# Patient Record
Sex: FEMALE | Race: BLACK OR AFRICAN AMERICAN | NOT HISPANIC OR LATINO | Employment: UNEMPLOYED | URBAN - METROPOLITAN AREA
[De-identification: names, ages, dates, MRNs, and addresses within clinical notes are randomized per-mention and may not be internally consistent; named-entity substitution may affect disease eponyms.]

---

## 2017-04-27 ENCOUNTER — CLINICAL SUPPORT (OUTPATIENT)
Dept: SMOKING CESSATION | Facility: CLINIC | Age: 67
End: 2017-04-27
Payer: COMMERCIAL

## 2017-04-27 DIAGNOSIS — F17.210 HEAVY CIGARETTE SMOKER (20-39 PER DAY): Primary | ICD-10-CM

## 2017-04-27 PROCEDURE — 99999 PR PBB SHADOW E&M-EST. PATIENT-LVL II: CPT | Mod: PBBFAC,,,

## 2017-04-27 PROCEDURE — 99404 PREV MED CNSL INDIV APPRX 60: CPT | Mod: S$GLB,,,

## 2017-04-27 RX ORDER — IBUPROFEN 200 MG
1 TABLET ORAL DAILY
Qty: 14 PATCH | Refills: 0 | OUTPATIENT
Start: 2017-04-27 | End: 2017-05-11 | Stop reason: SDUPTHER

## 2017-04-27 RX ORDER — NICOTINE 7MG/24HR
1 PATCH, TRANSDERMAL 24 HOURS TRANSDERMAL DAILY
Qty: 14 PATCH | Refills: 0 | OUTPATIENT
Start: 2017-04-27 | End: 2017-05-11 | Stop reason: SDUPTHER

## 2017-05-11 ENCOUNTER — CLINICAL SUPPORT (OUTPATIENT)
Dept: SMOKING CESSATION | Facility: CLINIC | Age: 67
End: 2017-05-11
Payer: COMMERCIAL

## 2017-05-11 DIAGNOSIS — F17.210 CIGARETTE NICOTINE DEPENDENCE, UNCOMPLICATED: Primary | ICD-10-CM

## 2017-05-11 DIAGNOSIS — F17.210 HEAVY CIGARETTE SMOKER (20-39 PER DAY): ICD-10-CM

## 2017-05-11 PROCEDURE — 99404 PREV MED CNSL INDIV APPRX 60: CPT | Mod: S$GLB,,,

## 2017-05-11 PROCEDURE — 99999 PR PBB SHADOW E&M-EST. PATIENT-LVL I: CPT | Mod: PBBFAC,,,

## 2017-05-11 RX ORDER — IBUPROFEN 200 MG
1 TABLET ORAL DAILY
Qty: 14 PATCH | Refills: 0 | Status: SHIPPED | OUTPATIENT
Start: 2017-05-11 | End: 2017-05-25

## 2017-05-11 RX ORDER — NICOTINE 7MG/24HR
1 PATCH, TRANSDERMAL 24 HOURS TRANSDERMAL DAILY
Qty: 14 PATCH | Refills: 0 | Status: SHIPPED | OUTPATIENT
Start: 2017-05-11 | End: 2017-05-25

## 2017-05-11 NOTE — Clinical Note
Patient reports positive response to patches. States she has not smoked since 5/5. Celebrated patient quit. Discussed what are some things she does to get through the urges. States she tell herself no and do something else. Also says she has money to do other things now. Is excited about quitting. Reviewed  learned addiction model, personal reasons for quitting, medications, goals. Discussed working on starting new hobbies to replace cigarettes. Will discuss next visit. Refilled patches. Denies any abnormal behavior and mental changes at this time.

## 2017-05-11 NOTE — PROGRESS NOTES
Individual Follow-Up Form    5/11/2017    Quit Date: 5/5/17    Clinical Status of Patient: Outpatient    Length of Service: 60 minutes    Continuing Medication: yes  Patches    Other Medications: none     Target Symptoms: Withdrawal and medication side effects. The following were  rated moderate (3) to severe (4) on TCRS:  · Moderate (3): desire  · Severe (4): none    Comments: Patient reports positive response to patches. States she has not smoked since 5/5. Celebrated patient quit. Discussed what are some things she does to get through the urges. States she tell herself no and do something else. Also says she has money to do other things now. Is excited about quitting. Reviewed  learned addiction model, personal reasons for quitting, medications, goals. Discussed working on starting new hobbies to replace cigarettes. Will discuss next visit. Refilled patches. Denies any abnormal behavior and mental changes at this time.    Diagnosis: F17.210    Next Visit: 2 weeks

## 2017-05-25 ENCOUNTER — CLINICAL SUPPORT (OUTPATIENT)
Dept: SMOKING CESSATION | Facility: CLINIC | Age: 67
End: 2017-05-25
Payer: COMMERCIAL

## 2017-05-25 DIAGNOSIS — F17.210 CIGARETTE NICOTINE DEPENDENCE, UNCOMPLICATED: Primary | ICD-10-CM

## 2017-05-25 PROCEDURE — 99404 PREV MED CNSL INDIV APPRX 60: CPT | Mod: S$GLB,,,

## 2017-05-25 PROCEDURE — 99999 PR PBB SHADOW E&M-EST. PATIENT-LVL I: CPT | Mod: PBBFAC,,,

## 2017-05-25 RX ORDER — NICOTINE 7MG/24HR
1 PATCH, TRANSDERMAL 24 HOURS TRANSDERMAL DAILY
Qty: 14 PATCH | Refills: 0 | Status: SHIPPED | OUTPATIENT
Start: 2017-05-25

## 2017-05-25 NOTE — Clinical Note
Patient remains quit. No lapse or relapse reported. Has not used patches in a couple of days due to some itching but was not sure if it was due to something else. States she get through urges by remembering why she is quitting. Is looking for new hobbies to start.. Advised to used 7 mg patch and will wean her off patch. Denies any behavioral changes at this time.

## 2017-05-25 NOTE — PROGRESS NOTES
Individual Follow-Up Form    5/25/2017    Quit Date: 5/5/17    Clinical Status of Patient: Outpatient    Length of Service: 60 minutes    Continuing Medication: no    Other Medications: none     Target Symptoms: Withdrawal and medication side effects. The following were  rated moderate (3) to severe (4) on TCRS:  · Moderate (3): none  · Severe (4): none    Comments: Patient remains quit. No lapse or relapse reported. Has not used patches in a couple of days due to some itching but was not sure if it was due to something else. States she get through urges by remembering why she is quitting. Is looking for new hobbies to start.. Advised to used 7 mg patch and will wean her off patch. Denies any behavioral changes at this forrest    Diagnosis: F17.210    Next Visit: 2 weeks

## 2017-06-15 ENCOUNTER — TELEPHONE (OUTPATIENT)
Dept: SMOKING CESSATION | Facility: CLINIC | Age: 67
End: 2017-06-15

## 2017-06-15 NOTE — TELEPHONE ENCOUNTER
Unsuccessful contact at [phone] . Left message following up on quit status. Left office number to contact smoking cessation specialist..

## 2018-04-02 ENCOUNTER — TELEPHONE (OUTPATIENT)
Dept: SMOKING CESSATION | Facility: CLINIC | Age: 68
End: 2018-04-02

## 2018-04-04 ENCOUNTER — TELEPHONE (OUTPATIENT)
Dept: SMOKING CESSATION | Facility: CLINIC | Age: 68
End: 2018-04-04

## 2018-04-10 ENCOUNTER — TELEPHONE (OUTPATIENT)
Dept: SMOKING CESSATION | Facility: CLINIC | Age: 68
End: 2018-04-10

## 2018-04-26 ENCOUNTER — TELEPHONE (OUTPATIENT)
Dept: SMOKING CESSATION | Facility: CLINIC | Age: 68
End: 2018-04-26

## 2023-06-30 ENCOUNTER — TELEPHONE (OUTPATIENT)
Dept: SURGERY | Facility: CLINIC | Age: 73
End: 2023-06-30
Payer: COMMERCIAL

## 2023-06-30 NOTE — TELEPHONE ENCOUNTER
Attempted to contact pt regarding appt request. CRS number provided to return call.       ----- Message from Tracie Truong sent at 6/30/2023  9:26 AM CDT -----  Regarding: Appt  Contact: Pt [phone]  Pt is calling to schedule a appt please call

## 2023-07-06 ENCOUNTER — TELEPHONE (OUTPATIENT)
Dept: SURGERY | Facility: CLINIC | Age: 73
End: 2023-07-06
Payer: COMMERCIAL

## 2023-07-06 ENCOUNTER — TELEPHONE (OUTPATIENT)
Dept: SURGERY | Facility: CLINIC | Age: 73
End: 2023-07-06
Payer: MEDICARE

## 2023-07-06 NOTE — TELEPHONE ENCOUNTER
"Spoke with pt regarding appt request per Dr. Riojas with [facility]. Pt states, "the office was to send a referral for her regarding recent colonoscopy done that showed inflammation in colon. She was instructed to schedule appt with CRS." Informed pt that we will request records to office and to bring colored copies of colonoscopy done on 6/1/23 to appt. Pt verbalized understanding. Offered pt next available appt on 7/28 @ 0840 with Dr. De Paz at [facility]. Directions provided to clinic location. Sending appt reminder to pt. Pt denies further questions.       ----- Message from Roxanna Booker RN sent at 7/6/2023  2:13 PM CDT -----  Regarding: FW: Missed Call  Contact: pt.[phone]    ----- Message -----  From: Shruthi CANTU Route  Sent: 7/6/2023   2:06 PM CDT  To: Zandra Phan Staff  Subject: Missed Call                                      Pt missed a call from micky and is asking she call back. Patient Requesting Call Back @ pt.[phone]      "

## 2023-07-06 NOTE — TELEPHONE ENCOUNTER
Attempted to contact pt regarding request for appt. No answer. Left message with CRS number to return call.       ----- Message from Cathleen Sweeney sent at 7/6/2023  1:22 PM CDT -----  Regarding: return call/appt  Contact: @[phone]  Pt is returning a missed call from Bhavna ... in regards to making a appointment ...Please call and adv @[phone]

## 2023-07-26 ENCOUNTER — TELEPHONE (OUTPATIENT)
Dept: SURGERY | Facility: CLINIC | Age: 73
End: 2023-07-26
Payer: MEDICARE

## 2023-07-27 NOTE — PROGRESS NOTES
"CRS Office Visit History and Physical    Referring Md:   Ady Riojas Md  [address]  [address]  [place] [address]    SUBJECTIVE:     Chief Complaint: fecal incontinence, abnormal colonoscopy    History of Present Illness:  The patient is a new patient to this practice.   Course is as follows:  Luiza Joseph is a 72 y.o. female presents after recent colonoscopy with Dr. Riojas on 23 for evaluation of rectal bleeding.  She was found to have diverticulosis and erythema and congestion in the mid rectum.  Biopsies consistent with mucosal prolapse.  Patient reports history of fecal incontinence.  She reports that she will have bowel movements every 3-4 days.  These episodes are associated with incomplete evacuation and incontinence.  Wears depends due to these episodes.  She reports no feeling of need to defecate.  Also reports urinary urgency.  No history of diabetes or obstetric trauma.  She does take a daily fiber supplement.      Last Colonoscopy: [facility]     Review of patient's allergies indicates:  No Known Allergies    Past Medical History:   Diagnosis Date    Hyperlipidemia      No past surgical history on file.  No family history on file.  Social History     Tobacco Use    Smoking status: Former     Packs/day: 1.50     Years: 46.00     Pack years: 69.00     Types: Cigarettes     Quit date: 2017     Years since quittin.2    Smokeless tobacco: Never   Substance Use Topics    Alcohol use: No    Drug use: No        Review of Systems:  Review of Systems   All other systems reviewed and are negative.    OBJECTIVE:     Vital Signs (Most Recent)  /65 (BP Location: Right arm, Patient Position: Sitting, BP Method: Large (Automatic))   Pulse 80   Resp 18   Ht 5' 5" (1.651 m)   Wt 80.1 kg (176 lb 9.4 oz)   SpO2 96%   BMI 29.39 kg/m²     Physical Exam:  General: 72 y.o. female in no distress   Neuro: alert and oriented x 4.  Moves all extremities.     HEENT: normocephalic, " atraumatic, PERRL, EOMI   Respiratory: respirations are even and unlabored  Cardiac: regular rate and rhythm  Abdomen: soft, NTND  Extremities: Warm dry and intact  Skin: no rashes  Anorectal: no external masses, XENIA with decreased resting tone, increase in tone with squeeze, relaxation with bear down, no evidence of prolapse     Anoscopy:   Verbal consent obtained  A lubricated anoscope was inserted and circumferential inspection performed  There were nonbleeding internal hemorrhoids visualized.   The scope was withdrawn.   Patient tolerated the procedure well.     Labs: NA    Imaging: NA      ASSESSMENT/PLAN:     Diagnoses and all orders for this visit:    Full incontinence of feces  -     Ambulatory referral/consult to Physical/Occupational Therapy; Future  -     MRI Defecography; Future  -     CREATININE, SERUM; Future        72 y.o. female with suspected solitary rectal ulcer syndrome and fecal incontinence     - Patient's endoscopic records reviewed, unable to fully appreciate findings on black and white copy, but description and biopsies consistent with solitary rectal ulcer syndrome.   - Will change fiber supplement to fiber con 2 tabs TID, continue drinking 8-10 glasses of water daily  - manometry, MR defecography and pelvic floor PT  - will follow in 3-6 months at [place] for flexible sigmoidoscopy to re-assess     Emilie De Paz MD  Staff Surgeon  Colon & Rectal Surgery

## 2023-07-28 ENCOUNTER — TELEPHONE (OUTPATIENT)
Dept: ENDOSCOPY | Facility: HOSPITAL | Age: 73
End: 2023-07-28
Payer: MEDICARE

## 2023-07-28 ENCOUNTER — OFFICE VISIT (OUTPATIENT)
Dept: SURGERY | Facility: CLINIC | Age: 73
End: 2023-07-28
Payer: MEDICARE

## 2023-07-28 VITALS
HEIGHT: 65 IN | HEART RATE: 80 BPM | DIASTOLIC BLOOD PRESSURE: 65 MMHG | SYSTOLIC BLOOD PRESSURE: 127 MMHG | RESPIRATION RATE: 18 BRPM | BODY MASS INDEX: 29.42 KG/M2 | OXYGEN SATURATION: 96 % | WEIGHT: 176.56 LBS

## 2023-07-28 VITALS — BODY MASS INDEX: 27.62 KG/M2 | WEIGHT: 176 LBS | HEIGHT: 67 IN

## 2023-07-28 DIAGNOSIS — R15.9 FULL INCONTINENCE OF FECES: Primary | ICD-10-CM

## 2023-07-28 DIAGNOSIS — R15.9 INCONTINENCE OF FECES, UNSPECIFIED FECAL INCONTINENCE TYPE: Primary | ICD-10-CM

## 2023-07-28 PROCEDURE — 3288F PR FALLS RISK ASSESSMENT DOCUMENTED: ICD-10-PCS | Mod: CPTII,S$GLB,, | Performed by: SURGERY

## 2023-07-28 PROCEDURE — 1159F MED LIST DOCD IN RCRD: CPT | Mod: CPTII,S$GLB,, | Performed by: SURGERY

## 2023-07-28 PROCEDURE — 1126F AMNT PAIN NOTED NONE PRSNT: CPT | Mod: CPTII,S$GLB,, | Performed by: SURGERY

## 2023-07-28 PROCEDURE — 3288F FALL RISK ASSESSMENT DOCD: CPT | Mod: CPTII,S$GLB,, | Performed by: SURGERY

## 2023-07-28 PROCEDURE — 1101F PT FALLS ASSESS-DOCD LE1/YR: CPT | Mod: CPTII,S$GLB,, | Performed by: SURGERY

## 2023-07-28 PROCEDURE — 99999 PR PBB SHADOW E&M-EST. PATIENT-LVL III: ICD-10-PCS | Mod: PBBFAC,,, | Performed by: SURGERY

## 2023-07-28 PROCEDURE — 99204 PR OFFICE/OUTPT VISIT, NEW, LEVL IV, 45-59 MIN: ICD-10-PCS | Mod: 25,S$GLB,, | Performed by: SURGERY

## 2023-07-28 PROCEDURE — 3074F SYST BP LT 130 MM HG: CPT | Mod: CPTII,S$GLB,, | Performed by: SURGERY

## 2023-07-28 PROCEDURE — 1159F PR MEDICATION LIST DOCUMENTED IN MEDICAL RECORD: ICD-10-PCS | Mod: CPTII,S$GLB,, | Performed by: SURGERY

## 2023-07-28 PROCEDURE — 46600 PR DIAG2STIC A2SCOPY: ICD-10-PCS | Mod: S$GLB,,, | Performed by: SURGERY

## 2023-07-28 PROCEDURE — 3008F BODY MASS INDEX DOCD: CPT | Mod: CPTII,S$GLB,, | Performed by: SURGERY

## 2023-07-28 PROCEDURE — 3078F DIAST BP <80 MM HG: CPT | Mod: CPTII,S$GLB,, | Performed by: SURGERY

## 2023-07-28 PROCEDURE — 99999 PR PBB SHADOW E&M-EST. PATIENT-LVL III: CPT | Mod: PBBFAC,,, | Performed by: SURGERY

## 2023-07-28 PROCEDURE — 3008F PR BODY MASS INDEX (BMI) DOCUMENTED: ICD-10-PCS | Mod: CPTII,S$GLB,, | Performed by: SURGERY

## 2023-07-28 PROCEDURE — 1101F PR PT FALLS ASSESS DOC 0-1 FALLS W/OUT INJ PAST YR: ICD-10-PCS | Mod: CPTII,S$GLB,, | Performed by: SURGERY

## 2023-07-28 PROCEDURE — 46600 DIAGNOSTIC ANOSCOPY SPX: CPT | Mod: S$GLB,,, | Performed by: SURGERY

## 2023-07-28 PROCEDURE — 3074F PR MOST RECENT SYSTOLIC BLOOD PRESSURE < 130 MM HG: ICD-10-PCS | Mod: CPTII,S$GLB,, | Performed by: SURGERY

## 2023-07-28 PROCEDURE — 1126F PR PAIN SEVERITY QUANTIFIED, NO PAIN PRESENT: ICD-10-PCS | Mod: CPTII,S$GLB,, | Performed by: SURGERY

## 2023-07-28 PROCEDURE — 3078F PR MOST RECENT DIASTOLIC BLOOD PRESSURE < 80 MM HG: ICD-10-PCS | Mod: CPTII,S$GLB,, | Performed by: SURGERY

## 2023-07-28 PROCEDURE — 99204 OFFICE O/P NEW MOD 45 MIN: CPT | Mod: 25,S$GLB,, | Performed by: SURGERY

## 2023-07-28 RX ORDER — ROSUVASTATIN CALCIUM 20 MG/1
20 TABLET, COATED ORAL
COMMUNITY
Start: 2023-03-27

## 2023-07-28 RX ORDER — EZETIMIBE 10 MG/1
10 TABLET ORAL
COMMUNITY
Start: 2023-07-17

## 2023-07-28 NOTE — TELEPHONE ENCOUNTER
Contacted the patient to schedule an endoscopy procedure(s) 7/28/23. The patient did not answer the call and left a voice message requesting a call back.

## 2023-07-28 NOTE — TELEPHONE ENCOUNTER
Spoke to Luiza to schedule procedure(s) Anorectal Manometry       Physician to perform procedure(s) Dr. LEANNE Colon  Date of Procedure (s) 8/30/23  Arrival Time 12:00 PM  Time of Procedure(s) 1:00 PM   Location of Procedure(s) [place] 4th Floor  Type of Rx Prep sent to patient: Enema  Instructions provided to patient via Postal Mail    Patient was informed on the following information and verbalized understanding. Screening questionnaire reviewed with patient and complete. If procedure requires anesthesia, a responsible adult needs to be present to accompany the patient home, patient cannot drive after receiving anesthesia. Appointment details are tentative, especially check-in time. Patient will receive a prep-op call 4 days prior to confirm check-in time for procedure. If applicable the patient should contact their pharmacy to verify Rx for procedure prep is ready for pick-up. Patient was advised to call the scheduling department at [phone] if pharmacy states no Rx is available. Patient was advised to call the endoscopy scheduling department if any questions or concerns arise.       Endoscopy Scheduling Department

## 2023-07-28 NOTE — TELEPHONE ENCOUNTER
Anorectal Physiology Testing-Patient Instructions      Date of procedure: 8/30/23 Arrive at: 12:00PM      Location of Department:   [facility] [place] [address]  Take the [facility] Elevators to 4th Floor Endoscopy Lab    IMPORTANT:  PLEASE READ CAREFULLY.  FAILURE TO FOLLOW THESE INSTRUCTIONS MAY RESULT IN YOUR PROCEDURE BEING CANCELED, RESCHEDULED, OR REPEATED.        On occasion, unforeseen circumstances may cause a delay in your procedure start time. We respect your time and appreciate your patience during these circumstances.      Please leave all valuables, jewelry at home and wear comfortable clothing.     Purchase 2 (two) Fleet disposable enemas from the pharmacy.        If you have any questions about the cost of the procedure, you should contact your health insurance co. as soon as possible. You can also call Pre-service at [facility] for co-pay and deductibles at [phone].      Please bring a picture ID, insurance card, & copayment.                          The day of the procedure DATE: 8/30/23     DO NOT EAT OR DRINK TWO (2) HOURS PRIOR TO PROCEDURE (this will begin at    11:00 am on the morning of your procedure. You will return to your normal diet once test is complete).     11:00 am INSERT TWO (2) FLEET ENEMAS INTO RECTUM TWO (2) HOURS PRIOR TO PROCEDURE.     Please take medications at your regular scheduled times as directed by your doctor.       If you have questions regarding the prep or need to reschedule, please call Nurse [phone] between the hours of Monday-Friday 8:00-4:30pm.           Notes:

## 2023-07-28 NOTE — TELEPHONE ENCOUNTER
"----- Message from Debra Rueda sent at 2023  9:06 AM CDT -----  Regarding: FW: Anal Manometry    ----- Message -----  From: Bhavna Jose RN  Sent: 2023   8:46 AM CDT  To: [facility] Endo Schedulers, #  Subject: Anal Manometry                                   Procedure: Anal Manometry    Diagnosis: Fecal Incontinence     Procedure Timin-4 weeks    #If within 4 weeks selected, please gómez as high priority#    #If greater than 12 weeks, please select "5-12 weeks" and delay sending until 2 months prior to requested date#     Provider: Any endoscopist    Location: [address]    Additional Scheduling Information: No scheduling concerns    Prep Specifications:Enema    Have you attached a patient to this message: yes       "

## 2023-07-28 NOTE — TELEPHONE ENCOUNTER
----- Message from Debra Rueda sent at 7/28/2023 11:30 AM CDT -----  Regarding: FW: Missed Call  Contact: [phone]    ----- Message -----  From: Rachell Archuleta  Sent: 7/28/2023  10:36 AM CDT  To: [facility] Endo Schedulers  Subject: Missed Call                                      Calling in regards to missed call from office. Please call and discuss.

## 2023-07-28 NOTE — TELEPHONE ENCOUNTER
"Message -----   From: Bhavna Jose RN   Sent: 2023   8:46 AM CDT   To: [facility] Endo Schedulers, *   Subject: Anal Manometry                                   Procedure: Anal Manometry     Diagnosis: Fecal Incontinence     Procedure Timin-4 weeks     *If within 4 weeks selected, please gómez as high priority*     *If greater than 12 weeks, please select "5-12 weeks" and delay sending until 2 months prior to requested date*     Provider: Any endoscopist     Location: [address]     Additional Scheduling Information: No scheduling concerns     Prep Specifications:Enema     Have you attached a patient to this message: yes      "

## 2023-08-18 ENCOUNTER — TELEPHONE (OUTPATIENT)
Dept: SURGERY | Facility: CLINIC | Age: 73
End: 2023-08-18
Payer: MEDICARE

## 2023-08-18 NOTE — TELEPHONE ENCOUNTER
Attempted to contact pt regarding MRI and labs. No answer. Left message stating appts need to be r/s to [facility] location. CRS number provided to return call. Appts moved to next available, 9/13.

## 2023-08-18 NOTE — TELEPHONE ENCOUNTER
Attempted to contact pt regarding upcoming appt for labs, MRI, and follow up appt with Dr. De Paz. No answer. Left voicemail for pt to return call. Sending appt slips in mail.

## 2023-08-21 ENCOUNTER — TELEPHONE (OUTPATIENT)
Dept: SURGERY | Facility: CLINIC | Age: 73
End: 2023-08-21
Payer: MEDICARE

## 2023-08-21 NOTE — TELEPHONE ENCOUNTER
Spoke with pt regarding upcoming imaging and appts. Directions and instructions provided regarding location and prep for upcoming procedures. Informed of appt slips being sent in mail to home. Pt denies further questions and concerns at this time.       ----- Message from Chet Samson MA sent at 8/21/2023 10:00 AM CDT -----  Regarding: RESULTS  Good Morning, Pt is calling to speak with someone in provider office regarding she wants to know about labs and MRI questions she is asking for a return call please call pt at [phone]

## 2023-08-28 ENCOUNTER — TELEPHONE (OUTPATIENT)
Dept: ENDOSCOPY | Facility: HOSPITAL | Age: 73
End: 2023-08-28
Payer: MEDICARE

## 2023-08-30 ENCOUNTER — HOSPITAL ENCOUNTER (OUTPATIENT)
Facility: HOSPITAL | Age: 73
Discharge: HOME | End: 2023-08-30
Attending: STUDENT IN AN ORGANIZED HEALTH CARE EDUCATION/TRAINING PROGRAM | Admitting: SURGERY
Payer: MEDICARE

## 2023-08-30 VITALS
DIASTOLIC BLOOD PRESSURE: 77 MMHG | WEIGHT: 173 LBS | HEIGHT: 67 IN | SYSTOLIC BLOOD PRESSURE: 123 MMHG | OXYGEN SATURATION: 97 % | TEMPERATURE: 98 F | RESPIRATION RATE: 16 BRPM | HEART RATE: 75 BPM | BODY MASS INDEX: 27.15 KG/M2

## 2023-08-30 DIAGNOSIS — R15.9 FECAL INCONTINENCE: ICD-10-CM

## 2023-08-30 PROCEDURE — 91122 PR ANAL PRESSURE RECORD: ICD-10-PCS | Mod: 26,,, | Performed by: STUDENT IN AN ORGANIZED HEALTH CARE EDUCATION/TRAINING PROGRAM

## 2023-08-30 PROCEDURE — 91120 HC RECTAL SENSATION TEST, BALLOON: CPT | Mod: TC | Performed by: STUDENT IN AN ORGANIZED HEALTH CARE EDUCATION/TRAINING PROGRAM

## 2023-08-30 PROCEDURE — 91120 PR RECTAL SENSATION TEST, BALLOON: CPT | Mod: 26,,, | Performed by: STUDENT IN AN ORGANIZED HEALTH CARE EDUCATION/TRAINING PROGRAM

## 2023-08-30 PROCEDURE — 91122 HC ANORECTAL MANOMETRY: CPT | Mod: TC | Performed by: STUDENT IN AN ORGANIZED HEALTH CARE EDUCATION/TRAINING PROGRAM

## 2023-08-30 PROCEDURE — 91122 PR ANAL PRESSURE RECORD: CPT | Mod: 26,,, | Performed by: STUDENT IN AN ORGANIZED HEALTH CARE EDUCATION/TRAINING PROGRAM

## 2023-08-30 PROCEDURE — 91120 PR RECTAL SENSATION TEST, BALLOON: ICD-10-PCS | Mod: 26,,, | Performed by: STUDENT IN AN ORGANIZED HEALTH CARE EDUCATION/TRAINING PROGRAM

## 2023-08-30 NOTE — OR NURSING
Anorectal manometry completed. Tolerated well. Scant amount of blood on catheter balloon on removal and scant amount with mucous after passing 60cc water filled expulsion balloon. Passed balloon in 20 seconds.AVS and discharge instructions reviewed and understood. No pain at time of discharge.

## 2023-09-05 ENCOUNTER — CLINICAL SUPPORT (OUTPATIENT)
Dept: REHABILITATION | Facility: OTHER | Age: 73
End: 2023-09-05
Attending: SURGERY
Payer: MEDICARE

## 2023-09-05 DIAGNOSIS — R15.9 FULL INCONTINENCE OF FECES: ICD-10-CM

## 2023-09-05 DIAGNOSIS — M62.81 WEAKNESS OF TRUNK MUSCULATURE: ICD-10-CM

## 2023-09-05 DIAGNOSIS — R27.8 COORDINATION IMPAIRMENT: ICD-10-CM

## 2023-09-05 DIAGNOSIS — M62.89 PELVIC FLOOR DYSFUNCTION: ICD-10-CM

## 2023-09-05 PROCEDURE — 97530 THERAPEUTIC ACTIVITIES: CPT | Mod: PN | Performed by: PHYSICAL THERAPIST

## 2023-09-05 PROCEDURE — 97161 PT EVAL LOW COMPLEX 20 MIN: CPT | Mod: PN | Performed by: PHYSICAL THERAPIST

## 2023-09-05 NOTE — PATIENT INSTRUCTIONS
Bowel Movement Body Mechanics  1. Sit on the toilet comfortably with legs and buttocks relaxed.  2. Put your feet on a step stool or squatty potty (~8 inches tall).  3. Lean forward while keeping your back straight and rest your elbows on your knees.  4. Keep your knees apart and place your hands on your belly  5. Inhale and make your belly big  6. Make the belly hard as you exhale like you are blowing out birthday candles while you gently bear down.   *Press the hands over the lower belly to prevent it from pushing out with bearing down  *Do not strain, do not hold your breath.
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
## 2023-09-06 NOTE — PROVATION PATIENT INSTRUCTIONS
Discharge Summary/Instructions after an Endoscopic Procedure  Patient Name: Luiza Joseph  Patient MRN: [id number]  Patient YOB: 1950 Wednesday, August 30, 2023  Fahad Poole MD  Dear patient,  As a result of recent federal legislation (The Federal Cures Act), you may   receive lab or pathology results from your procedure in your [facility]   account before your physician is able to contact you. Your physician or   their representative will relay the results to you with their   recommendations at their soonest availability.  Thank you,  RESTRICTIONS:  During your procedure today, you received medications for sedation.  These   medications may affect your judgment, balance and coordination.  Therefore,   for 24 hours, you have the following restrictions:   - DO NOT drive a car, operate machinery, make legal/financial decisions,   sign important papers or drink alcohol.    ACTIVITY:  Today: no heavy lifting, straining or running due to procedural   sedation/anesthesia.  The following day: return to full activity including work.  DIET:  Eat and drink normally unless instructed otherwise.     TREATMENT FOR COMMON SIDE EFFECTS:  - Mild abdominal pain, nausea, belching, bloating or excessive gas:  rest,   eat lightly and use a heating pad.  - Sore Throat: treat with throat lozenges and/or gargle with warm salt   water.  - Because air was used during the procedure, expelling large amounts of air   from your rectum or belching is normal.  - If a bowel prep was taken, you may not have a bowel movement for 1-3 days.    This is normal.  SYMPTOMS TO WATCH FOR AND REPORT TO YOUR PHYSICIAN:  1. Abdominal pain or bloating, other than gas cramps.  2. Chest pain.  3. Back pain.  4. Signs of infection such as: chills or fever occurring within 24 hours   after the procedure.  5. Rectal bleeding, which would show as bright red, maroon, or black stools.   (A tablespoon of blood from the rectum is not serious,  especially if   hemorrhoids are present.)  6. Vomiting.  7. Weakness or dizziness.  GO DIRECTLY TO THE NEAREST EMERGENCY ROOM IF YOU HAVE ANY OF THE FOLLOWING:      Difficulty breathing              Chills and/or fever over 101 F   Persistent vomiting and/or vomiting blood   Severe abdominal pain   Severe chest pain   Black, tarry stools   Bleeding- more than one tablespoon   Any other symptom or condition that you feel may need urgent attention  Your doctor recommends these additional instructions:  If any biopsies were taken, your doctors clinic will contact you in 1 to 2   weeks with any results.  - Refer to pelvic floor physical therapy  For questions, problems or results please call your physician - Fahad Poole MD at Work:  [phone].  [facility] [place], EMERGENCY ROOM PHONE NUMBER: [phone]  IF A COMPLICATION OR EMERGENCY SITUATION ARISES AND YOU ARE UNABLE TO REACH   YOUR PHYSICIAN - GO DIRECTLY TO THE EMERGENCY ROOM.  Fahad Poole MD  9/6/2023 1:47:58 PM  This report has been verified and signed electronically.  Dear patient,  As a result of recent federal legislation (The Federal Cures Act), you may   receive lab or pathology results from your procedure in your [facility]   account before your physician is able to contact you. Your physician or   their representative will relay the results to you with their   recommendations at their soonest availability.  Thank you,  PROVATION

## 2023-09-07 ENCOUNTER — TELEPHONE (OUTPATIENT)
Dept: ENDOSCOPY | Facility: HOSPITAL | Age: 73
End: 2023-09-07
Payer: MEDICARE

## 2023-09-07 NOTE — TELEPHONE ENCOUNTER
Called patient. Patient did not answer. Left message unsure of who tried to reach her about a procedure. May be from a duplicate  inbasket order for an ARM procedure. Phone number provided.

## 2023-09-12 NOTE — PROGRESS NOTES
Pelvic Health Physical Therapy   Treatment Note     Name: Luiza [facility]  Clinic Number: [id number]    Therapy Diagnosis:   Encounter Diagnoses   Name Primary?    Pelvic floor dysfunction Yes    Weakness of trunk musculature     Coordination impairment      Referring Provider: Emilie De Paz MD    Visit Date: 9/14/2023    Referring Provider Orders: PT Eval and Treat  Medical Diagnosis from Referral: Full incontinence of feces [R15.9]  Evaluation Date: 9/5/2023  Authorization Period Expiration: 7/27/2024  Plan of Care Expiration: 12/5/2023  Visit # / Visits authorized: 2/20  FOTO: 1 (9/5/2023)    Cancelled Visits: -  No Show Visits: -    Time In: 12:56  Time Out: 13:52  Total Billable Time: 53 minutes    Precautions: standard    Subjective     Luiza reports her usual symptoms. Defecating with basin under feet helps slightly.    She  was somewhat  compliant with home exercise program.  Response to previous treatment: no adverse effect  Functional change: slightly improved ease of having bowel movements    Pain: none related to pelvic floor    Objective     Luiza received the following interventions during the treatment session:   (TrA = transverse abdominis, PFM = pelvic floor muscle, sEMG = surface electromyography)  Pt consented to pelvic floor muscle assessment and treatment in prior visit. See EMR.    Therapeutic activities to improve functional performance for 15 minutes -  [x] Pelvic floor and belly assessment - see below  [x] Education on bowel movement optimization - positioning, toilet stool, breath coordination, pelvic floor relaxation, abdominal wall bracing  [x] HEP building/HEP review    Neuromuscular re-education activities to develop Coordination and Control for 30 minutes -   [x] Diaphragmatic breathing with verbal and tactile cues - pt able to demonstrate correctly with verbal and tactile cues  [x] Practice bearing down with verbal and tactile cues to belly and/or pelvic floor - Pt improved  "with practice and with visual feedback from mirror, though she struggles with pairing abdominal wall contraction with bearing down, stating "I've been doing it wrong this whole time."  [x] PFM squeezes without glute substitution, 2x5 with 10-second hold - verbal cues required  [x] PFM squeeze + bridging - unable to tolerate due to hamstring spasm    Therapeutic exercises to develop strength, endurance and core stabilization for 8 minutes -  [x] Supine clam with blue band around knees, x30  [x] Bent knee fall outs (R&L), x20    ----------------------------------------    ABDOMINAL WALL ASSESSMENT  Palpation: no tenderness, low tone  Pelvic Girdle Stability: Pt demonstrates moderately impaired ability to stabilize pelvis with Active Straight Leg Raise Test.   Diastasis Recti: present, 1-finger width, present with supine curl-up task    BREATHING MECHANICS ASSESSMENT   Thorax Assessment During Quiet Respiration: Decreased excursion of abdominal wall   Thorax Assessment During Deep Respiration: Decreased excursion of abdominal wall     RECTAL PELVIC FLOOR EXAM - external assessment  Anus: WNL  Skin condition: WNL   Scarring: none  Sensation: WNL   Pain: no tenderness  Voluntary contraction: visible lift and accessory muscle use  Voluntary relaxation: visible drop  Involuntary contraction: visible lift  Bearing down: bulge  Discharge: none   Comments: good contraction, fatigues after ~5 reps, no discontinuity of external anal sphincter     Home Exercises and Patient Education     Patient Education: progression of plan of care, plan for next session, pt prognosis, pelvic floor anatomy & function, relationship between TrA & PFM, relationship between hips & PFM, proper body mechanics for bowel movement, and abdominal wall anatomy    Home Exercise Program: use toilet stool  Home Exercise Program Updates: PFM squeezes, bent knee fall outs, supine clam    Exercises were reviewed, and Luiza was able to demonstrate them " prior to the end of the session, as needed. Luiza demonstrated good understanding of the education provided.     Assessment     Pt tolerated treatment session well, with fair understanding of education provided and improving pelvic floor and belly coordination by the end of the session. Pt demonstrates functional strength deficits, finding all strengthening exercises challenging today. Improving strength in the abdominal wall and pelvic floor should improve continence and ability to produce effective bowel movements. Pt's functional mobility and ability to perform ADLs still limited by pelvic floor dysfunction. She requires skilled therapy for continued patient education and coordination retraining.      Luiza is progressing well towards her goals.   Pt prognosis: good    Pt will continue to benefit from skilled outpatient physical therapy to address the deficits listed in the problem list box on initial evaluation, provide pt/family education and to maximize pt's level of independence in the home and community environment.     Pt's spiritual, cultural and educational needs considered and pt agreeable to plan of care and goals.  Anticipated barriers to physical therapy: cognitive deficits      Short Term Goals: 6 weeks   Pt to report >50% improvement in fecal leakage - NOT MET  Pt to report nocturia of no more than 3 times per night for improved sleep quality - NOT MET  Pt to report >50% improvement in constipation and ease of bowel movements to reduce impact on urinary urgency - NOT MET  Pt to report >50% improvement in discomfort with defecation - NOT MET     Long Term Goals: 12 weeks   Pt to report >90% improvement in fecal leakage - NOT MET  Pt to report nocturia of no more than 1-2 times per night for improved sleep quality - NOT MET  Pt to report >90% improvement in constipation and ease of bowel movements to reduce impact on urinary urgency - NOT MET  Pt to report >90% improvement in discomfort with  defecation - NOT MET  Pt to score no more than 30% impairment on the Bowel Constipation FOTO survey to demonstrate reduced impairment due to pelvic floor dysfunction - NOT MET     Plan     Continue per Plan of Care      Jennie Campuzano, PT, DPT

## 2023-09-13 ENCOUNTER — HOSPITAL ENCOUNTER (OUTPATIENT)
Dept: RADIOLOGY | Facility: HOSPITAL | Age: 73
Discharge: HOME | End: 2023-09-13
Attending: SURGERY
Payer: MEDICARE

## 2023-09-13 DIAGNOSIS — R15.9 FULL INCONTINENCE OF FECES: ICD-10-CM

## 2023-09-13 PROCEDURE — 72195 MRI DEFECOGRAPHY: ICD-10-PCS | Mod: 26,,, | Performed by: RADIOLOGY

## 2023-09-13 PROCEDURE — 72195 MRI PELVIS W/O DYE: CPT | Mod: 26,,, | Performed by: RADIOLOGY

## 2023-09-13 PROCEDURE — 72195 MRI PELVIS W/O DYE: CPT | Mod: TC

## 2023-09-14 ENCOUNTER — CLINICAL SUPPORT (OUTPATIENT)
Dept: REHABILITATION | Facility: OTHER | Age: 73
End: 2023-09-14
Payer: MEDICARE

## 2023-09-14 DIAGNOSIS — M62.81 WEAKNESS OF TRUNK MUSCULATURE: ICD-10-CM

## 2023-09-14 DIAGNOSIS — M62.89 PELVIC FLOOR DYSFUNCTION: Primary | ICD-10-CM

## 2023-09-14 DIAGNOSIS — R27.8 COORDINATION IMPAIRMENT: ICD-10-CM

## 2023-09-14 PROCEDURE — 97110 THERAPEUTIC EXERCISES: CPT | Mod: PN | Performed by: PHYSICAL THERAPIST

## 2023-09-14 PROCEDURE — 97530 THERAPEUTIC ACTIVITIES: CPT | Mod: PN | Performed by: PHYSICAL THERAPIST

## 2023-09-14 PROCEDURE — 97112 NEUROMUSCULAR REEDUCATION: CPT | Mod: PN | Performed by: PHYSICAL THERAPIST

## 2023-09-14 NOTE — PATIENT INSTRUCTIONS
Pelvic floor squeeze, 2 sets of 10 with 10-second hold  - Gently squeeze the pelvic floor (imagine closing the labia like doors to the vagina)  *Don't hold your breath  *Can perform lying down or sitting up    Supine clam with band, x30  - Holding the hips level, push against the band to widen the knees  *Don't hold your breath        Supine bent knee fall out (BKFO), x30  - Holding the hips level, gently drop one hip to the side (but only as far as you can control)  *Don't hold your breath

## 2023-09-18 NOTE — PROGRESS NOTES
Pelvic Health Physical Therapy   Treatment Note     Name: Luiza [facility]  Clinic Number: [id number]    Therapy Diagnosis:   Encounter Diagnoses   Name Primary?    Pelvic floor dysfunction Yes    Weakness of trunk musculature     Coordination impairment      Referring Provider: Emilie De Paz MD    Visit Date: 9/21/2023    Referring Provider Orders: PT Eval and Treat  Medical Diagnosis from Referral: Full incontinence of feces [R15.9]  Evaluation Date: 9/5/2023  Authorization Period Expiration: 7/27/2024  Plan of Care Expiration: 12/5/2023  Visit # / Visits authorized: 3/20  FOTO: 2 (9/5/2023, 9/21/2023)    Cancelled Visits: -  No Show Visits: -    Time In: 13:00  Time Out: 13:45  Total Billable Time: 40 minutes    Precautions: standard    Subjective     Luiza reports reduced bowel accidents this last week. She's been producing type 4 and 5 stool. Still sometimes has frequent bowel movements.    She  was somewhat  compliant with home exercise program.  Response to previous treatment: no adverse effect  Functional change: slightly improved ease of having bowel movements    Pain: none related to pelvic floor    Objective     Luiza received the following interventions during the treatment session:   (TrA = transverse abdominis, PFM = pelvic floor muscle, sEMG = surface electromyography)  Pt consented to pelvic floor muscle assessment and treatment in prior visit. See EMR.    Therapeutic activities to improve functional performance for 12 minutes -  [] Pelvic floor and belly assessment - see below  [x] Education on bowel movement optimization - positioning, toilet stool, breath coordination, pelvic floor relaxation, abdominal wall bracing  [x] HEP building/HEP review    Neuromuscular re-education activities to develop Coordination and Control for 8 minutes -   [] Diaphragmatic breathing with verbal and tactile cues - pt able to demonstrate correctly with verbal and tactile cues  [] Practice bearing down with  "verbal and tactile cues to belly and/or pelvic floor - Pt improved with practice and with visual feedback from mirror, though she struggles with pairing abdominal wall contraction with bearing down, stating "I've been doing it wrong this whole time."  [] PFM squeezes without glute substitution, 2x5 with 10-second hold - verbal cues required  [x] PFM squeeze + bridging with belt around knees, 2x6 - still gets hamstring spasm after a few reps  [x] PFM squeeze + hooklying hip adduction with ball between knees, 2x20    Therapeutic exercises to develop strength, endurance and core stabilization for 20 minutes -  [x] Supine clam with blue band around knees, x30  [x] Bent knee fall outs (R&L), x25  [x] Side-lying clams (R&L), x20  [x] Straight leg raise (R&L), 3x10  [x] Hooklying shoulder extension with blue band, x16        Limitation/Restriction for FOTO Pelvic Floor Surveys     Therapist reviewed FOTO scores for Luiza Joseph on 9/21/2023  FOTO documents entered into [employer] - see Media section.     Limitation Score:   9/5/23  PFDI Bowel - 42% impairment  Bowel Constipation - 45% impairment    9/21/23  PFDI Bowel - 8% impairment  Bowel Constipation - 48% impairment        Home Exercises and Patient Education     Patient Education: progression of plan of care, plan for next session, pt prognosis, pelvic floor anatomy & function, relationship between TrA & PFM, relationship between hips & PFM, proper body mechanics for bowel movement, and abdominal wall anatomy    Home Exercise Program (9/5/23): use toilet stool  Home Exercise Program Updates (9/14/23): PFM squeezes, bent knee fall outs, supine clam  HEP update (9/21/23): none    Exercises were reviewed, and Luiza was able to demonstrate them prior to the end of the session, as needed. Luiza demonstrated good understanding of the education provided.     Assessment     Pt tolerated treatment session well, with fair understanding of education provided and improving " pelvic floor and belly coordination by the end of the session. Pt demonstrates functional strength deficits, finding all strengthening exercises challenging today, but she tolerated better than last session. Improving strength in the abdominal wall and pelvic floor should improve continence and ability to produce effective bowel movements. Pt reports improving ability to produced bowel movement with focusing on abdominal wall activation for pushing, but she requires additional training. Pt's functional mobility and ability to perform ADLs still limited by pelvic floor dysfunction. She requires skilled therapy for continued patient education and coordination retraining.      Luiza is progressing well towards her goals.   Pt prognosis: good    Pt will continue to benefit from skilled outpatient physical therapy to address the deficits listed in the problem list box on initial evaluation, provide pt/family education and to maximize pt's level of independence in the home and community environment.     Pt's spiritual, cultural and educational needs considered and pt agreeable to plan of care and goals.  Anticipated barriers to physical therapy: cognitive deficits      Short Term Goals: 6 weeks   Pt to report >50% improvement in fecal leakage - MET  Pt to report nocturia of no more than 3 times per night for improved sleep quality - NOT MET  Pt to report >50% improvement in constipation and ease of bowel movements to reduce impact on urinary urgency - MET  Pt to report >50% improvement in discomfort with defecation - MET     Long Term Goals: 12 weeks   Pt to report >90% improvement in fecal leakage - NOT MET  Pt to report nocturia of no more than 1-2 times per night for improved sleep quality - NOT MET  Pt to report >90% improvement in constipation and ease of bowel movements to reduce impact on urinary urgency - NOT MET  Pt to report >90% improvement in discomfort with defecation - NOT MET  Pt to score no more than  30% impairment on the Bowel Constipation FOTO survey to demonstrate reduced impairment due to pelvic floor dysfunction - NOT MET     Plan     Continue per Plan of Care      Jennie Campuzano, PT, DPT

## 2023-09-21 ENCOUNTER — CLINICAL SUPPORT (OUTPATIENT)
Dept: REHABILITATION | Facility: OTHER | Age: 73
End: 2023-09-21
Payer: MEDICARE

## 2023-09-21 DIAGNOSIS — R27.8 COORDINATION IMPAIRMENT: ICD-10-CM

## 2023-09-21 DIAGNOSIS — M62.89 PELVIC FLOOR DYSFUNCTION: Primary | ICD-10-CM

## 2023-09-21 DIAGNOSIS — M62.81 WEAKNESS OF TRUNK MUSCULATURE: ICD-10-CM

## 2023-09-21 PROCEDURE — 97112 NEUROMUSCULAR REEDUCATION: CPT | Mod: PN | Performed by: PHYSICAL THERAPIST

## 2023-09-21 PROCEDURE — 97110 THERAPEUTIC EXERCISES: CPT | Mod: PN | Performed by: PHYSICAL THERAPIST

## 2023-09-21 PROCEDURE — 97530 THERAPEUTIC ACTIVITIES: CPT | Mod: PN | Performed by: PHYSICAL THERAPIST

## 2023-10-06 ENCOUNTER — CLINICAL SUPPORT (OUTPATIENT)
Dept: REHABILITATION | Facility: OTHER | Age: 73
End: 2023-10-06
Payer: MEDICARE

## 2023-10-06 DIAGNOSIS — R27.8 COORDINATION IMPAIRMENT: ICD-10-CM

## 2023-10-06 DIAGNOSIS — M62.89 PELVIC FLOOR DYSFUNCTION: Primary | ICD-10-CM

## 2023-10-06 DIAGNOSIS — M62.81 WEAKNESS OF TRUNK MUSCULATURE: ICD-10-CM

## 2023-10-06 PROCEDURE — 97530 THERAPEUTIC ACTIVITIES: CPT | Mod: PN | Performed by: PHYSICAL THERAPIST

## 2023-10-06 NOTE — PROGRESS NOTES
Pelvic Health Physical Therapy   Treatment Note     [place]  Clinic Number: [id number]    Therapy Diagnosis:   Encounter Diagnoses   Name Primary?    Pelvic floor dysfunction Yes    Weakness of trunk musculature     Coordination impairment      Referring Provider: Emilie De Paz MD    Visit Date: 10/6/2023    Referring Provider Orders: PT Eval and Treat  Medical Diagnosis from Referral: Full incontinence of feces [R15.9]  Evaluation Date: 9/5/2023  Authorization Period Expiration: 7/27/2024  Plan of Care Expiration: 12/5/2023  Visit # / Visits authorized: 5/20  FOTO: 2 (9/5/2023, 9/21/2023)    Cancelled Visits: -  No Show Visits: -    Time In: 16:00  Time Out: 16:47  Total Billable Time: 40 minutes    Precautions: standard    Subjective     Luiza reports significant rectal bleeding recently. She recalls an incident of fecal incontinence (stool falling out after bowel movement).    She was not compliant with home exercise program.  Response to previous treatment: no adverse effect  Functional change: slightly improved ease of having bowel movements    Pain: none related to pelvic floor    Objective     Luiza received the following interventions during the treatment session:   (TrA = transverse abdominis, PFM = pelvic floor muscle, sEMG = surface electromyography)  Pt consented to pelvic floor muscle assessment and treatment in prior visit. See EMR.    Therapeutic activities to improve functional performance for 38 minutes -  [x] Pelvic floor and belly assessment - good PFM squeeze and good coordination with bearing down  [x] Patient education - Pt required reinstruction on most topics already covered in physical therapy, including pelvic floor anatomy/function, mechanics of defecation, importance of toilet stool  [x] Education on bowel movement optimization - positioning, toilet stool, breath coordination, pelvic floor relaxation, abdominal wall bracing  [x] HEP building/HEP  "review    Neuromuscular re-education activities to develop Coordination and Control for 2 minutes -   [] Diaphragmatic breathing with verbal and tactile cues - pt able to demonstrate correctly with verbal and tactile cues  [] Practice bearing down with verbal and tactile cues to belly and/or pelvic floor - Pt improved with practice and with visual feedback from mirror, though she struggles with pairing abdominal wall contraction with bearing down, stating "I've been doing it wrong this whole time."  [x] PFM squeezes without glute substitution, 3x5 with 5-second hold - verbal cues required  [] PFM squeeze + bridging with belt around knees, 2x6 - still gets hamstring spasm after a few reps  [] PFM squeeze + hooklying hip adduction with ball between knees, 2x20    Therapeutic exercises to develop strength, endurance and core stabilization for 0 minutes -  [] Supine clam with blue band around knees, x30  [] Bent knee fall outs (R&L), x25  [] Side-lying clams (R&L), x20  [] Straight leg raise (R&L), 3x10  [] Hooklying shoulder extension with blue band, x16        Home Exercises and Patient Education     Patient Education: progression of plan of care, plan for next session, pt prognosis, pelvic floor anatomy & function, relationship between TrA & PFM, relationship between hips & PFM, proper body mechanics for bowel movement, and abdominal wall anatomy    Home Exercise Program (9/5/23): use toilet stool  Home Exercise Program Updates (9/14/23): PFM squeezes, bent knee fall outs, supine clam  HEP update (9/21/23): none  HEP update (10/6/23): none    Exercises were reviewed, and Luiza was able to demonstrate them prior to the end of the session, as needed. Luiza demonstrated good understanding of the education provided.     Assessment     Pt tolerated treatment session well, with fair understanding of education provided and improving pelvic floor and belly coordination by the end of the session. Reassessment of " pelvic floor coordination reveals good bearing down and squeezes. Improving strength in the abdominal wall and pelvic floor should improve continence and ability to produce effective bowel movements (exercises present in HEP). Pt's functional mobility and ability to perform ADLs still limited by pelvic floor dysfunction. She requires skilled therapy for continued patient education and coordination retraining.      Luiza is progressing well towards her goals.   Pt prognosis: good    Pt will continue to benefit from skilled outpatient physical therapy to address the deficits listed in the problem list box on initial evaluation, provide pt/family education and to maximize pt's level of independence in the home and community environment.     Pt's spiritual, cultural and educational needs considered and pt agreeable to plan of care and goals.  Anticipated barriers to physical therapy: cognitive deficits, HEP non-compliance      Short Term Goals: 6 weeks   Pt to report >50% improvement in fecal leakage - MET  Pt to report nocturia of no more than 3 times per night for improved sleep quality - NOT MET  Pt to report >50% improvement in constipation and ease of bowel movements to reduce impact on urinary urgency - MET  Pt to report >50% improvement in discomfort with defecation - MET     Long Term Goals: 12 weeks   Pt to report >90% improvement in fecal leakage - NOT MET  Pt to report nocturia of no more than 1-2 times per night for improved sleep quality - NOT MET  Pt to report >90% improvement in constipation and ease of bowel movements to reduce impact on urinary urgency - NOT MET  Pt to report >90% improvement in discomfort with defecation - NOT MET  Pt to score no more than 30% impairment on the Bowel Constipation FOTO survey to demonstrate reduced impairment due to pelvic floor dysfunction - NOT MET     Plan     Continue per Plan of Care      Jennie Campuzano, PT, DPT

## 2023-10-23 ENCOUNTER — TELEPHONE (OUTPATIENT)
Dept: SURGERY | Facility: CLINIC | Age: 73
End: 2023-10-23
Payer: MEDICARE

## 2023-10-23 ENCOUNTER — OFFICE VISIT (OUTPATIENT)
Dept: SURGERY | Facility: CLINIC | Age: 73
End: 2023-10-23
Payer: MEDICARE

## 2023-10-23 VITALS
HEART RATE: 77 BPM | WEIGHT: 170.56 LBS | HEIGHT: 67 IN | BODY MASS INDEX: 26.77 KG/M2 | SYSTOLIC BLOOD PRESSURE: 149 MMHG | RESPIRATION RATE: 19 BRPM | OXYGEN SATURATION: 98 % | DIASTOLIC BLOOD PRESSURE: 79 MMHG

## 2023-10-23 DIAGNOSIS — M62.89 PELVIC FLOOR DYSFUNCTION: Primary | ICD-10-CM

## 2023-10-23 PROCEDURE — 3288F FALL RISK ASSESSMENT DOCD: CPT | Mod: CPTII,S$GLB,, | Performed by: SURGERY

## 2023-10-23 PROCEDURE — 99999 PR PBB SHADOW E&M-EST. PATIENT-LVL III: ICD-10-PCS | Mod: PBBFAC,,, | Performed by: SURGERY

## 2023-10-23 PROCEDURE — 99999 PR PBB SHADOW E&M-EST. PATIENT-LVL III: CPT | Mod: PBBFAC,,, | Performed by: SURGERY

## 2023-10-23 PROCEDURE — 3288F PR FALLS RISK ASSESSMENT DOCUMENTED: ICD-10-PCS | Mod: CPTII,S$GLB,, | Performed by: SURGERY

## 2023-10-23 PROCEDURE — 1159F MED LIST DOCD IN RCRD: CPT | Mod: CPTII,S$GLB,, | Performed by: SURGERY

## 2023-10-23 PROCEDURE — 45330 DIAGNOSTIC SIGMOIDOSCOPY: CPT | Mod: 52,,, | Performed by: SURGERY

## 2023-10-23 PROCEDURE — 3008F BODY MASS INDEX DOCD: CPT | Mod: CPTII,S$GLB,, | Performed by: SURGERY

## 2023-10-23 PROCEDURE — 3078F PR MOST RECENT DIASTOLIC BLOOD PRESSURE < 80 MM HG: ICD-10-PCS | Mod: CPTII,S$GLB,, | Performed by: SURGERY

## 2023-10-23 PROCEDURE — 3078F DIAST BP <80 MM HG: CPT | Mod: CPTII,S$GLB,, | Performed by: SURGERY

## 2023-10-23 PROCEDURE — 45330 PR SIGMOIDOSCOPY,DIAG2STIC: ICD-10-PCS | Mod: 52,,, | Performed by: SURGERY

## 2023-10-23 PROCEDURE — 3077F SYST BP >= 140 MM HG: CPT | Mod: CPTII,S$GLB,, | Performed by: SURGERY

## 2023-10-23 PROCEDURE — 3077F PR MOST RECENT SYSTOLIC BLOOD PRESSURE >= 140 MM HG: ICD-10-PCS | Mod: CPTII,S$GLB,, | Performed by: SURGERY

## 2023-10-23 PROCEDURE — 99213 OFFICE O/P EST LOW 20 MIN: CPT | Mod: 25,S$GLB,, | Performed by: SURGERY

## 2023-10-23 PROCEDURE — 1101F PR PT FALLS ASSESS DOC 0-1 FALLS W/OUT INJ PAST YR: ICD-10-PCS | Mod: CPTII,S$GLB,, | Performed by: SURGERY

## 2023-10-23 PROCEDURE — 1125F PR PAIN SEVERITY QUANTIFIED, PAIN PRESENT: ICD-10-PCS | Mod: CPTII,S$GLB,, | Performed by: SURGERY

## 2023-10-23 PROCEDURE — 1125F AMNT PAIN NOTED PAIN PRSNT: CPT | Mod: CPTII,S$GLB,, | Performed by: SURGERY

## 2023-10-23 PROCEDURE — 1159F PR MEDICATION LIST DOCUMENTED IN MEDICAL RECORD: ICD-10-PCS | Mod: CPTII,S$GLB,, | Performed by: SURGERY

## 2023-10-23 PROCEDURE — 3008F PR BODY MASS INDEX (BMI) DOCUMENTED: ICD-10-PCS | Mod: CPTII,S$GLB,, | Performed by: SURGERY

## 2023-10-23 PROCEDURE — 1101F PT FALLS ASSESS-DOCD LE1/YR: CPT | Mod: CPTII,S$GLB,, | Performed by: SURGERY

## 2023-10-23 PROCEDURE — 99213 PR OFFICE/OUTPT VISIT, EST, LEVL III, 20-29 MIN: ICD-10-PCS | Mod: 25,S$GLB,, | Performed by: SURGERY

## 2023-10-23 NOTE — PROVATION PATIENT INSTRUCTIONS
Discharge Summary/Instructions after an Endoscopic Procedure  Patient Name: Luiza Joseph  Patient MRN: [id number]  Patient YOB: 1950 Monday, October 23, 2023  Emilie De Paz MD  Dear patient,  As a result of recent federal legislation (The Federal Cures Act), you may   receive lab or pathology results from your procedure in your [facility]   account before your physician is able to contact you. Your physician or   their representative will relay the results to you with their   recommendations at their soonest availability.  Thank you,  RESTRICTIONS:  During your procedure today, you received medications for sedation.  These   medications may affect your judgment, balance and coordination.  Therefore,   for 24 hours, you have the following restrictions:   - DO NOT drive a car, operate machinery, make legal/financial decisions,   sign important papers or drink alcohol.    ACTIVITY:  Today: no heavy lifting, straining or running due to procedural   sedation/anesthesia.  The following day: return to full activity including work.  DIET:  Eat and drink normally unless instructed otherwise.     TREATMENT FOR COMMON SIDE EFFECTS:  - Mild abdominal pain, nausea, belching, bloating or excessive gas:  rest,   eat lightly and use a heating pad.  - Sore Throat: treat with throat lozenges and/or gargle with warm salt   water.  - Because air was used during the procedure, expelling large amounts of air   from your rectum or belching is normal.  - If a bowel prep was taken, you may not have a bowel movement for 1-3 days.    This is normal.  SYMPTOMS TO WATCH FOR AND REPORT TO YOUR PHYSICIAN:  1. Abdominal pain or bloating, other than gas cramps.  2. Chest pain.  3. Back pain.  4. Signs of infection such as: chills or fever occurring within 24 hours   after the procedure.  5. Rectal bleeding, which would show as bright red, maroon, or black stools.   (A tablespoon of blood from the rectum is not serious, especially  if   hemorrhoids are present.)  6. Vomiting.  7. Weakness or dizziness.  GO DIRECTLY TO THE NEAREST EMERGENCY ROOM IF YOU HAVE ANY OF THE FOLLOWING:      Difficulty breathing              Chills and/or fever over 101 F   Persistent vomiting and/or vomiting blood   Severe abdominal pain   Severe chest pain   Black, tarry stools   Bleeding- more than one tablespoon   Any other symptom or condition that you feel may need urgent attention  Your doctor recommends these additional instructions:  If any biopsies were taken, your doctors clinic will contact you in 1 to 2   weeks with any results.  - Discharge patient to home (ambulatory).   - Patient has a contact number available for emergencies.  The signs and   symptoms of potential delayed complications were discussed with the   patient.  Return to normal activities tomorrow.  Written discharge   instructions were provided to the patient.  For questions, problems or results please call your physician - Emilie De Paz MD at Work:  [phone].  [facility], EMERGENCY ROOM PHONE NUMBER: [phone]  IF A COMPLICATION OR EMERGENCY SITUATION ARISES AND YOU ARE UNABLE TO REACH   YOUR PHYSICIAN - GO DIRECTLY TO THE EMERGENCY ROOM.  MD Emilie Cash MD  10/23/2023 4:10:48 PM  This report has been verified and signed electronically.  Dear patient,  As a result of recent federal legislation (The Federal Cures Act), you may   receive lab or pathology results from your procedure in your [facility]   account before your physician is able to contact you. Your physician or   their representative will relay the results to you with their   recommendations at their soonest availability.  Thank you,  PROVATION

## 2023-10-23 NOTE — TELEPHONE ENCOUNTER
"Spoke with pt regarding appt today at 1:40 PM in the [facility]. Pt verbally confirmed time and location. Pt states, "her son will be bringing her." Pt denies questions at this time.   "

## 2023-10-26 NOTE — PROGRESS NOTES
Colon & Rectal Surgery Clinic Follow Up    HPI:   Luiza Joseph is a 73 y.o. female who presents for follow up of fecal incontinence and possible rectal prolapse.      Has been doing pelvic floor PT and reports improvement in symptoms.  Using squatty potty now.  Reports that last episode of prolapse was 3 weeks ago after prolonged (> 1 hour) on the toilet.  Decreased episodes of incontinence.       Objective:   Vitals:    10/23/23 1342   BP: (!) 149/79   Pulse: 77   Resp: 19        Physical Exam   Gen: well developed female, NAD  HEENT: normocephalic, atraumatic, PERRL, EOMI   CV: RRR, no murmurs  Resp: nonlabored, CTAB   Abd: soft, NTND   MSK: no gross deformities, no cyanosis or edema   Anorectal: diminished rectal tone, increase with squeeze, no prolapse visualized with valsalva or on toilet     MR:    Interpretation: Widened levator hiatus and normal anorectal junction at rest with moderate/Grade 2 (8-10 cm) widening and mild/Grade 1 (2-4 cm) descent during defecation/maximal strain.     Anterior Compartment     Bladder base location relative to the PCL     Rest: 2.3 cm above.     Defecation/Maximal strain: 0.3 cm below.     Interpretation: No cystocele.     Urethral hypermobility: Present.     Middle Compartment     Cervix location relative to PCL     Rest: 2.3 cm above.     Defecation/Maximal strain: 0.9 cm below.     Interpretation: No vaginal descent.     Posterior Compartment     Anorectal angle     Rest: 116 degrees.     Kegel: 77 degrees.     Defecation/Maximal strain: 114 degrees.     Interpretation: Normal resting angle with expected narrowing during Kegel and no change during defecation/maximal strain, likely due to inadequate evacuation.     Rectal Intussusception: Absent.     Rectocele: Present.     Rectocele size: 1.8 cm (AP dimension).     Interpretation: Small (<2 cm) rectocele.     Peritoneocele/Enterocele/Sigmoidocele: Absent.     Interpretation: No peritoneocele/enterocele/sigmoidocele.      Other: Colonic diverticula.  Lower lumbar spondylosis, partially visualized.     Impression:     Significantly limited evaluation due to inadequate evacuation.     1.    Anatomic findings: Nonoperative with normal levator anatomy.     2.    Functional evaluation: Widened levator hiatus and normal anorectal junction at rest with moderate/Grade 2 (8-10 cm) widening and mild/Grade 1 (2-4 cm) descent during defecation/maximal strain.     3.    Anterior compartment findings: No significant cystocele.     4.    Middle compartment findings: Upper normal vaginal descent.     5.    Posterior compartment findings: Limited evaluation.     Electronically signed by resident: Pavan Otoole  Date:                                            09/14/2023  Time:                                           13:45     Electronically signed by: Artie Pepper MD  Date:                                            09/15/2023  Time:                                           09:44    Manometry:   Resting Normal Squeeze Normal  Mean Sphincter Pressure(rectal ref.)(mmHg) 38.7 Max. Sphincter Pressure(rectal ref.)(mmHg) 49.5  Max. Sphincter Pressure(rectal ref.)(mmHg) 44.9 Max. Sphincter Pressure(abs. ref.)(mmHg) 59.8  Mean Sphincter Pressure(abs. ref.)(mmHg) 49.2 Duration of sustained squeeze(sec) 20.0  Max. Sphincter Pressure(abs. ref.)(mmHg) 55.3  Length of HPZ(cm) 3.1  Length verge to center(cm) 2.2  Push(attempted defecation) Normal Balloon Inflation Normal  Residual Anal Pressure(abs. ref.)(mmHg) 47.2 RAIR Present present  Percent anal relaxation(%) 2 First sensation(cc) 40  Intrarectal pressure(mmHg) 14.6 Urge to defecate(cc) 50  Rectoanal pressure differential(mmHg) -32.7 Discomfort(cc) N/A  Minimum rectal compliance 0.19  Maximum rectal compliance 6.82  Procedure  High Resolution Anorectal Manometry w/ balloon expulsion  Indications  Interpretation / Findings  Fecal Incontinence  Impressions  Passed 60cc water filled expulsion balloon in  20 seconds      Procedure: See provation for flex sig report     Assessment and Plan:   Luiza Joseph  is a 73 y.o. female who presents for follow up of fecal incontinence and rectal prolapse    - MR defecography and manometry reviewed.  Suboptimal MR as patient did not adequately defecate.   - patient with improved symptoms with PFPT. Unable to reproduce prolapse on toilet.  Patient does have descent on valsalva  - previously seen SRUS by Dr. Riojas is resolved on today's flex sig.    - Somewhat difficult to obtain accurate history from patient.  Will keep bowel diary and follow up in 1 month to determine next steps       Emilie De Paz MD  Staff Surgeon   Colon & Rectal Surgery

## 2023-11-29 ENCOUNTER — TELEPHONE (OUTPATIENT)
Dept: SURGERY | Facility: CLINIC | Age: 73
End: 2023-11-29
Payer: MEDICARE

## 2023-12-01 ENCOUNTER — OFFICE VISIT (OUTPATIENT)
Dept: SURGERY | Facility: CLINIC | Age: 73
End: 2023-12-01
Payer: MEDICARE

## 2023-12-01 VITALS
HEART RATE: 90 BPM | SYSTOLIC BLOOD PRESSURE: 115 MMHG | RESPIRATION RATE: 19 BRPM | DIASTOLIC BLOOD PRESSURE: 76 MMHG | HEIGHT: 67 IN | OXYGEN SATURATION: 97 % | BODY MASS INDEX: 26.33 KG/M2 | WEIGHT: 167.75 LBS

## 2023-12-01 DIAGNOSIS — M62.89 PELVIC FLOOR DYSFUNCTION: Primary | ICD-10-CM

## 2023-12-01 PROCEDURE — 3288F PR FALLS RISK ASSESSMENT DOCUMENTED: ICD-10-PCS | Mod: CPTII,S$GLB,, | Performed by: SURGERY

## 2023-12-01 PROCEDURE — 3074F PR MOST RECENT SYSTOLIC BLOOD PRESSURE < 130 MM HG: ICD-10-PCS | Mod: CPTII,S$GLB,, | Performed by: SURGERY

## 2023-12-01 PROCEDURE — 3074F SYST BP LT 130 MM HG: CPT | Mod: CPTII,S$GLB,, | Performed by: SURGERY

## 2023-12-01 PROCEDURE — 99999 PR PBB SHADOW E&M-EST. PATIENT-LVL III: ICD-10-PCS | Mod: PBBFAC,,, | Performed by: SURGERY

## 2023-12-01 PROCEDURE — 3078F DIAST BP <80 MM HG: CPT | Mod: CPTII,S$GLB,, | Performed by: SURGERY

## 2023-12-01 PROCEDURE — 3008F BODY MASS INDEX DOCD: CPT | Mod: CPTII,S$GLB,, | Performed by: SURGERY

## 2023-12-01 PROCEDURE — 99999 PR PBB SHADOW E&M-EST. PATIENT-LVL III: CPT | Mod: PBBFAC,,, | Performed by: SURGERY

## 2023-12-01 PROCEDURE — 1159F PR MEDICATION LIST DOCUMENTED IN MEDICAL RECORD: ICD-10-PCS | Mod: CPTII,S$GLB,, | Performed by: SURGERY

## 2023-12-01 PROCEDURE — 1101F PR PT FALLS ASSESS DOC 0-1 FALLS W/OUT INJ PAST YR: ICD-10-PCS | Mod: CPTII,S$GLB,, | Performed by: SURGERY

## 2023-12-01 PROCEDURE — 1126F AMNT PAIN NOTED NONE PRSNT: CPT | Mod: CPTII,S$GLB,, | Performed by: SURGERY

## 2023-12-01 PROCEDURE — 1101F PT FALLS ASSESS-DOCD LE1/YR: CPT | Mod: CPTII,S$GLB,, | Performed by: SURGERY

## 2023-12-01 PROCEDURE — 3078F PR MOST RECENT DIASTOLIC BLOOD PRESSURE < 80 MM HG: ICD-10-PCS | Mod: CPTII,S$GLB,, | Performed by: SURGERY

## 2023-12-01 PROCEDURE — 99213 PR OFFICE/OUTPT VISIT, EST, LEVL III, 20-29 MIN: ICD-10-PCS | Mod: S$GLB,,, | Performed by: SURGERY

## 2023-12-01 PROCEDURE — 1159F MED LIST DOCD IN RCRD: CPT | Mod: CPTII,S$GLB,, | Performed by: SURGERY

## 2023-12-01 PROCEDURE — 1126F PR PAIN SEVERITY QUANTIFIED, NO PAIN PRESENT: ICD-10-PCS | Mod: CPTII,S$GLB,, | Performed by: SURGERY

## 2023-12-01 PROCEDURE — 3008F PR BODY MASS INDEX (BMI) DOCUMENTED: ICD-10-PCS | Mod: CPTII,S$GLB,, | Performed by: SURGERY

## 2023-12-01 PROCEDURE — 3288F FALL RISK ASSESSMENT DOCD: CPT | Mod: CPTII,S$GLB,, | Performed by: SURGERY

## 2023-12-01 PROCEDURE — 99213 OFFICE O/P EST LOW 20 MIN: CPT | Mod: S$GLB,,, | Performed by: SURGERY

## 2023-12-01 NOTE — PROGRESS NOTES
Colon & Rectal Surgery Clinic Follow Up    HPI:   Luiza Joseph is a 73 y.o. female who presents for follow up of SRUS with concern for prolapse and FI.  At last visit, FI had improved and only 1 episode of prolapse after 1 hour on the toilet.  Since then continues to do well.        Objective:   Vitals:    12/01/23 1010   BP: 115/76   Pulse: 90   Resp: 19        Physical Exam   Gen: well developed female, NAD  HEENT: normocephalic, atraumatic, PERRL, EOMI   CV: RRR, no murmurs  Resp: nonlabored, CTAB   Abd: soft, NTND   MSK: no gross deformities, no cyanosis or edema   Anorectal: no reproducible prolapse on exam     Assessment and Plan:   Luiza Joseph  is a 73 y.o. female who presents for follow up of FI and concern for prolapse    - Patient's symptoms have improved since initial visit with pelvic floor PT.  Moderate response to therapy in last PT note 10/6/23.    - Patient referred with rectal ulcer, flex sig 10/23 with resolution of ulcer  - Patient with reported history of prolapse but have been unable to elicit this with valsalva, toilet exam or imaging.  Would not recommend surgical intervention at this time.   - Follow up in 3-6 months or sooner if symptoms worsen       Emilie De Paz MD  Staff Surgeon   Colon & Rectal Surgery

## 2025-06-06 NOTE — PROGRESS NOTES
[facility]      CHIEF COMPLAINT     Chief Complaint   Patient presents with    [facility] Care       HPI     Luiza Joseph is a 74 y.o. female with a PMHx of  HLD, and tobacco dependence (46 years, quit 5/4/2017), COPD diagnosis, who presents to establish care.     Health Goals:   1) COPD   2) heart risk factors   3) right shoulder and neck pain, radiating   4) Be more active.     Social History - lives with 3 sons in their 50s.  On son has autism (John)   Up at 0600, does household chores, (washer not working properly)   Cooks   Exercise- household chores, up and down the stairs.  Reads fitness magazine.     # She has run out of medications:  rosuvastatin and Bevespi inhaler, which was helping improve her breathing.     History of Present Illness    CHIEF COMPLAINT:  Luiza presents today for follow up    MUSCULOSKELETAL:  She reports bilateral shoulder pain radiating to neck, more severe in right shoulder. The pain is diffuse without specific localization to bone or joint. She has limited range of motion, particularly when reaching behind back for washing, and notes audible popping sounds with movement. She recently injured her toe after hitting a doorframe, describing collapse of bones with associated pain. She sought medical attention 4 days after the incident.    COPD:  She has history of COPD with prior smoking history of 46 years, quitting on May 4th, 2017. Previous COPD medications ran out when former physician left practice.    GASTROINTESTINAL:  She reports chronic constipation persisting despite adequate water intake.    OCULAR:  She reports ongoing vision problems with inability to see properly through prescribed glasses and prefers ophthalmologist consultation.    FAMILY HISTORY:  Family history is significant for diabetes (grandfather insulin-dependent, father onset in 40s, brother diagnosed at age 77 with glucose of 377) and hypercholesterolemia.    SURGICAL  HISTORY:  Complete tooth extraction performed in June 2004.    CURRENT MEDICATIONS:  She takes Rosuvastatin for cholesterol management. Supplements include Vitamin C 500, Calcium 600, Potassium 90 mcg, Magnesium, Vitamin K, Vitamin D3 2000, B12, and Zinc.          CHRONIC HEALTH ISSUES:  Dr. Engel. - colonoscopy - polyps removed.   >>  polyps removed and 5 year   2023 - Sigmoidoscopy - Dr. De Paz - resolution of rectal ulcer.     Lipids  On rosuvastatin 20mg   Lab Results   Component Value Date    LDLCALC 152.2 06/09/2025      The 10-year ASCVD risk score (Padmini HUBBARD, et al., 2019) is: 15.7%    Values used to calculate the score:      Age: 74 years      Sex: Female      Is Non- : Yes      Diabetic: No      Tobacco smoker: No      Systolic Blood Pressure: 104 mmHg      Is BP treated: No      HDL Cholesterol: 73 mg/dL      Total Cholesterol: 261 mg/dL     Hypertension  BP today:  Medications:    CKD  Computed KFRE 5-Year unavailable. One or more values for this score either were not found within the given timeframe or did not fit some other criterion.       Past Medical History:  Past Medical History:   Diagnosis Date    Chronic obstructive pulmonary disease 6/9/2025    Hyperlipidemia        Advanced care planning:    Date: 06/06/2025    Power of   I initiated the process of voluntary advance care planning today and explained the importance of this process to the patient.  I introduced the concept of advance directives to the patient, as well. Then the patient received detailed information about the importance of designating a Health Care Power of  (HCPOA). She was also instructed to communicate with this person about their wishes for future healthcare, should she become sick and lose decision-making capacity. The patient has previously appointed a HCPOA. After our discussion, the patient has decided to complete a HCPOA and has appointed her son, health care agent: Justin Pacejaky  or Samy Spann & health care agent number: on file. I encouraged her to communicate with this person about their wishes for future healthcare, should she become sick and lose decision-making capacity.      A total of 5 min was spent on advance care planning, goals of care discussion, emotional support, formulating and communicating prognosis and exploring burden/benefit of various approaches of treatment. This discussion occurred on a fully voluntary basis with the verbal consent of the patient and/or family.         Past Surgical History:  Past Surgical History:   Procedure Laterality Date    ANORECTAL MANOMETRY N/A 2023    Procedure: MANOMETRY, ANORECTAL;  Surgeon: Fahad Poole MD;  Location: [facility] ([address] [place] [address]);  Service: Endoscopy;  Laterality: N/A;  Enema prep  prep instructions mailed -  time frame 1-4 weeks   referral SANTIAGO De Paz  2023 - pre-call completed, location, no ride needed, arrival time, enemas, NPO, pt may arrive early to do enemas if needed (fecal incontinence) LV3   Colonoscopy in 2017    Allergies:  Review of patient's allergies indicates:  No Known Allergies    Home Medications:  Prior to Admission medications    Medication Sig Start Date End Date Taking? Authorizing Provider   ezetimibe (ZETIA) 10 mg tablet Take 10 mg by mouth. 23   Provider, Historical   nicotine (NICODERM CQ) 7 mg/24 hr Place 1 patch onto the skin once daily. 17   Danielle Hill MD   rosuvastatin (CRESTOR) 20 MG tablet Take 20 mg by mouth. 3/27/23   Provider, Historical       Family History:  No family history on file.    Social History:  Social History     Socioeconomic History    Marital status: Single   Tobacco Use    Smoking status: Former     Current packs/day: 0.00     Average packs/day: 1.5 packs/day for 46.0 years (69.0 ttl pk-yrs)     Types: Cigarettes     Start date: 1971     Quit date: 2017     Years since quittin.1    Smokeless tobacco: Never   Substance and  Sexual Activity    Alcohol use: No    Drug use: No    Sexual activity: Not Currently     Partners: Male   Social History Narrative    Lives with 3 sons.     Social Drivers of Health     Financial Resource Strain: High Risk (2/20/2025)    Received from [facility] SDOH Screening     In the past year, have you been unable to get any of the following when you really needed them? choose all that apply.: Medicine or health care     In the past year, have you been unable to get any of the following when you really needed them? choose all that apply.: Clothing   Food Insecurity: High Risk (2/20/2025)    Received from [facility] SDOH Screening     In the past 2 months, did you or others you live with eat smaller meals or skip meals because you didn't have money for food?: Yes   Transportation Needs: High Risk (2/20/2025)    Received from [facility] SDOH Screening     Has lack of transportation kept you from medical appointments, meetings, work or from getting things needed for daily living? choose all that apply.: Yes, it has kept me from medical appointments or from getting my medications     Has lack of transportation kept you from medical appointments, meetings, work or from getting things needed for daily living? choose all that apply.: Yes, it has kept me from non-medical meetings, appointments, work or from getting things that i need   Physical Activity: Not on File (12/18/2020)    Received from [facility]    Physical Activity     Physical Activity: 0   Stress: Not on File (12/18/2020)    Received from [facility]    Stress     Stress: 0   Recent Concern: Stress - At Risk (12/18/2020)    Received from [facility]    Stress     Stress: 2   Housing Stability: High Risk (2/20/2025)    Received from [facility] SDOH Screening     What is your living situation today?: I have a place to live today, but i am worried about losing it in the future        Review of Systems:  ROS  General:  "-fever, -chills, -fatigue, -weight gain, -weight loss  Eyes: -vision changes, -redness, -discharge, +wear glasses or contacts  ENT: -ear pain, -nasal congestion, -sore throat  Cardiovascular: -chest pain, -palpitations, -lower extremity edema  Respiratory: -cough, -shortness of breath  Gastrointestinal: -abdominal pain, -nausea, -vomiting, -diarrhea, +constipation, -blood in stool  Genitourinary: -dysuria, -hematuria, -frequency  Musculoskeletal: +joint pain, -muscle pain, +pain with movement, +limited movement  Skin: -rash, -lesion  Neurological: -headache, -dizziness, -numbness, -tingling  Psychiatric: -anxiety, -depression, -sleep difficulty     Health Maintainence:   Health Maintenance Due   Topic Date Due    TETANUS VACCINE  Never done    Shingles Vaccine (1 of 2) Never done    LDCT Lung Screen  03/04/2022    COVID-19 Vaccine (5 - 2024-25 season) 09/01/2024    Mammogram  05/30/2025          PHYSICAL EXAM     /76 (BP Location: Right arm, Patient Position: Sitting)   Pulse 82   Temp 96.4 °F (35.8 °C) (Temporal)   Resp 18   Ht 5' 7" (1.702 m)   Wt 80.6 kg (177 lb 9.3 oz)   SpO2 99%   BMI 27.81 kg/m²     Physical Exam  Constitutional:       Appearance: Normal appearance.      Comments: Animated affect, pleasant   HENT:      Head: Normocephalic and atraumatic.      Right Ear: Ear canal normal.      Left Ear: Ear canal normal.      Nose: Nose normal.      Mouth/Throat:      Mouth: Mucous membranes are moist.      Pharynx: Oropharynx is clear.      Comments: Adentate  Eyes:      Extraocular Movements: Extraocular movements intact.      Conjunctiva/sclera: Conjunctivae normal.      Pupils: Pupils are equal, round, and reactive to light.   Cardiovascular:      Rate and Rhythm: Normal rate and regular rhythm.      Pulses: Normal pulses.      Heart sounds: Normal heart sounds.   Pulmonary:      Effort: Pulmonary effort is normal.      Breath sounds: Normal breath sounds.   Abdominal:      General: Bowel " sounds are normal.      Palpations: Abdomen is soft.   Musculoskeletal:         General: Normal range of motion.      Cervical back: Normal range of motion and neck supple.      Right lower leg: No edema.      Left lower leg: No edema.   Skin:     General: Skin is warm and dry.   Neurological:      General: No focal deficit present.      Mental Status: She is alert and oriented to person, place, and time.      Comments: Possible tardive diskinesis.  Patient has no teeth x 20 years and says she has always moved her jaw in this way.    Psychiatric:         Mood and Affect: Mood normal.         Behavior: Behavior normal.            LABS     Previous labs reviewed.    Lab Results   Component Value Date    WBC 3.28 (L) 06/09/2025    HGB 11.6 (L) 06/09/2025    HCT 37.7 06/09/2025     06/09/2025    CHOL 261 (H) 06/09/2025    TRIG 179 (H) 06/09/2025    HDL 73 06/09/2025    ALT 23 06/09/2025    AST 24 06/09/2025     06/09/2025    K 4.8 06/09/2025     06/09/2025    CREATININE 0.8 06/09/2025    BUN 11 06/09/2025    CO2 26 06/09/2025    HGBA1C 6.0 (H) 12/29/2020         ASSESSMENT/PLAN     Luiza Joseph is a 74 y.o. female who presents to establish care and address chronic medical conditions.   1. Personal history of nicotine dependence  -     Cancel: CT Chest Lung Screening Low Dose; Future; Expected date: 06/09/2025  -     CT Chest Lung Screening Low Dose; Future; Expected date: 06/09/2025    2. Breast cancer screening by mammogram  -     Mammo Digital Screening Bilat w/ West (XPD); Future; Expected date: 06/09/2025    3. Need for pneumococcal vaccine  -     pneumoc 20-sindy conj-dip cr(PF) (PREVNAR-20 (PF)) injection Syrg 0.5 mL    4. Hyperlipidemia, unspecified hyperlipidemia type  Comments:  restart rosuvastatin. hold zetia and recheck lipid panel  Orders:  -     Cancel: Lipid panel; Future; Expected date: 06/09/2025  -     Comprehensive Metabolic Panel; Future; Expected date: 06/09/2025  -     Cancel:  TSH; Future; Expected date: 06/09/2025  -     Cancel: Microalbumin/Creatinine Ratio, Urine; Future  -     Cancel: Hemoglobin A1C; Future; Expected date: 06/09/2025  -     TSH  -     Microalbumin/Creatinine Ratio, Urine  -     Lipid panel  -     Hemoglobin A1C  -     rosuvastatin (CRESTOR) 20 MG tablet; Take 1 tablet (20 mg total) by mouth once daily.  Dispense: 90 tablet; Refill: 3    5. History of anemia  -     Cancel: CBC Auto Differential; Future; Expected date: 09/09/2025  -     CBC Auto Differential    6. Abnormal finding of blood chemistry, unspecified  -     Cancel: Hemoglobin A1C; Future; Expected date: 06/09/2025  -     Hemoglobin A1C    7. Blurred vision, bilateral  -     Ambulatory referral/consult to Ophthalmology; Future; Expected date: 06/16/2025    8. Chronic obstructive pulmonary disease, unspecified COPD type  Comments:  46 year pack history of smoking, quit in 2017.  Restart known inhaler.  No known PFTs, clear exam, CT lung pending for lung cancer screening.  Orders:  -     glycopyrrolate-formoteroL (BEVESPI AEROSPHERE) 9-4.8 mcg HFAA; Inhale 2 puffs into the lungs 2 (two) times a day. Controller  Dispense: 10.7 g; Refill: 3    9. Decreased range of motion of right shoulder  -     Ambulatory referral/consult to Orthopedics; Future; Expected date: 06/16/2025    10. Constipation, unspecified constipation type  -     polyethylene glycol (GLYCOLAX) 17 gram/dose powder; Take 17 g by mouth once daily.         ACP: discussion had about ACP to include definition of ACP, HCP, CODE STATUS. Paperwork handed to patient, to review, discuss with family and return it to clinic to be scanned into the chart.   Advance Care Planning       Total time spent on this encounter was greater than 60 minutes.    Audrey Cueva MD  Internal Medicine  [facility] [place]

## 2025-06-09 ENCOUNTER — OFFICE VISIT (OUTPATIENT)
Facility: CLINIC | Age: 75
End: 2025-06-09
Payer: MEDICARE

## 2025-06-09 VITALS
HEIGHT: 67 IN | OXYGEN SATURATION: 99 % | HEART RATE: 82 BPM | BODY MASS INDEX: 27.87 KG/M2 | WEIGHT: 177.56 LBS | SYSTOLIC BLOOD PRESSURE: 104 MMHG | TEMPERATURE: 96 F | RESPIRATION RATE: 18 BRPM | DIASTOLIC BLOOD PRESSURE: 76 MMHG

## 2025-06-09 DIAGNOSIS — E78.5 HYPERLIPIDEMIA, UNSPECIFIED HYPERLIPIDEMIA TYPE: ICD-10-CM

## 2025-06-09 DIAGNOSIS — J44.9 CHRONIC OBSTRUCTIVE PULMONARY DISEASE, UNSPECIFIED COPD TYPE: ICD-10-CM

## 2025-06-09 DIAGNOSIS — Z12.31 BREAST CANCER SCREENING BY MAMMOGRAM: ICD-10-CM

## 2025-06-09 DIAGNOSIS — M25.611 DECREASED RANGE OF MOTION OF RIGHT SHOULDER: ICD-10-CM

## 2025-06-09 DIAGNOSIS — H53.8 BLURRED VISION, BILATERAL: ICD-10-CM

## 2025-06-09 DIAGNOSIS — Z86.2 HISTORY OF ANEMIA: ICD-10-CM

## 2025-06-09 DIAGNOSIS — Z23 NEED FOR PNEUMOCOCCAL VACCINE: ICD-10-CM

## 2025-06-09 DIAGNOSIS — Z87.891 PERSONAL HISTORY OF NICOTINE DEPENDENCE: Primary | ICD-10-CM

## 2025-06-09 DIAGNOSIS — R79.9 ABNORMAL FINDING OF BLOOD CHEMISTRY, UNSPECIFIED: ICD-10-CM

## 2025-06-09 DIAGNOSIS — K59.00 CONSTIPATION, UNSPECIFIED CONSTIPATION TYPE: ICD-10-CM

## 2025-06-09 LAB
ABSOLUTE EOSINOPHIL (OHS): 0.05 K/UL
ABSOLUTE MONOCYTE (OHS): 0.45 K/UL (ref 0.3–1)
ABSOLUTE NEUTROPHIL COUNT (OHS): 1.3 K/UL (ref 1.8–7.7)
ALBUMIN SERPL BCP-MCNC: 3.9 G/DL (ref 3.5–5.2)
ALP SERPL-CCNC: 65 UNIT/L (ref 40–150)
ALT SERPL W/O P-5'-P-CCNC: 23 UNIT/L (ref 10–44)
ANION GAP (OHS): 10 MMOL/L (ref 8–16)
AST SERPL-CCNC: 24 UNIT/L (ref 11–45)
BASOPHILS # BLD AUTO: 0.03 K/UL
BASOPHILS NFR BLD AUTO: 0.9 %
BILIRUB SERPL-MCNC: 0.6 MG/DL (ref 0.1–1)
BUN SERPL-MCNC: 11 MG/DL (ref 8–23)
CALCIUM SERPL-MCNC: 9.4 MG/DL (ref 8.7–10.5)
CHLORIDE SERPL-SCNC: 105 MMOL/L (ref 95–110)
CHOLEST SERPL-MCNC: 261 MG/DL (ref 120–199)
CHOLEST/HDLC SERPL: 3.6 {RATIO} (ref 2–5)
CO2 SERPL-SCNC: 26 MMOL/L (ref 23–29)
CREAT SERPL-MCNC: 0.8 MG/DL (ref 0.5–1.4)
ERYTHROCYTE [DISTWIDTH] IN BLOOD BY AUTOMATED COUNT: 13.6 % (ref 11.5–14.5)
GFR SERPLBLD CREATININE-BSD FMLA CKD-EPI: >60 ML/MIN/1.73/M2
GLUCOSE SERPL-MCNC: 86 MG/DL (ref 70–110)
HCT VFR BLD AUTO: 37.7 % (ref 37–48.5)
HDLC SERPL-MCNC: 73 MG/DL (ref 40–75)
HDLC SERPL: 28 % (ref 20–50)
HGB BLD-MCNC: 11.6 GM/DL (ref 12–16)
IMM GRANULOCYTES # BLD AUTO: 0 K/UL (ref 0–0.04)
IMM GRANULOCYTES NFR BLD AUTO: 0 % (ref 0–0.5)
LDLC SERPL CALC-MCNC: 152.2 MG/DL (ref 63–159)
LYMPHOCYTES # BLD AUTO: 1.45 K/UL (ref 1–4.8)
MCH RBC QN AUTO: 27.3 PG (ref 27–31)
MCHC RBC AUTO-ENTMCNC: 30.8 G/DL (ref 32–36)
MCV RBC AUTO: 89 FL (ref 82–98)
NONHDLC SERPL-MCNC: 188 MG/DL
NUCLEATED RBC (/100WBC) (OHS): 0 /100 WBC
PLATELET # BLD AUTO: 223 K/UL (ref 150–450)
PMV BLD AUTO: 9 FL (ref 9.2–12.9)
POTASSIUM SERPL-SCNC: 4.8 MMOL/L (ref 3.5–5.1)
PROT SERPL-MCNC: 7.8 GM/DL (ref 6–8.4)
RBC # BLD AUTO: 4.25 M/UL (ref 4–5.4)
RELATIVE EOSINOPHIL (OHS): 1.5 %
RELATIVE LYMPHOCYTE (OHS): 44.2 % (ref 18–48)
RELATIVE MONOCYTE (OHS): 13.7 % (ref 4–15)
RELATIVE NEUTROPHIL (OHS): 39.7 % (ref 38–73)
SODIUM SERPL-SCNC: 141 MMOL/L (ref 136–145)
TRIGL SERPL-MCNC: 179 MG/DL (ref 30–150)
WBC # BLD AUTO: 3.28 K/UL (ref 3.9–12.7)

## 2025-06-09 PROCEDURE — 3078F DIAST BP <80 MM HG: CPT | Mod: CPTII,S$GLB,, | Performed by: STUDENT IN AN ORGANIZED HEALTH CARE EDUCATION/TRAINING PROGRAM

## 2025-06-09 PROCEDURE — 84443 ASSAY THYROID STIM HORMONE: CPT | Performed by: STUDENT IN AN ORGANIZED HEALTH CARE EDUCATION/TRAINING PROGRAM

## 2025-06-09 PROCEDURE — 1158F ADVNC CARE PLAN TLK DOCD: CPT | Mod: CPTII,S$GLB,, | Performed by: STUDENT IN AN ORGANIZED HEALTH CARE EDUCATION/TRAINING PROGRAM

## 2025-06-09 PROCEDURE — 80061 LIPID PANEL: CPT | Performed by: STUDENT IN AN ORGANIZED HEALTH CARE EDUCATION/TRAINING PROGRAM

## 2025-06-09 PROCEDURE — 3288F FALL RISK ASSESSMENT DOCD: CPT | Mod: CPTII,S$GLB,, | Performed by: STUDENT IN AN ORGANIZED HEALTH CARE EDUCATION/TRAINING PROGRAM

## 2025-06-09 PROCEDURE — 82043 UR ALBUMIN QUANTITATIVE: CPT | Performed by: STUDENT IN AN ORGANIZED HEALTH CARE EDUCATION/TRAINING PROGRAM

## 2025-06-09 PROCEDURE — 1101F PT FALLS ASSESS-DOCD LE1/YR: CPT | Mod: CPTII,S$GLB,, | Performed by: STUDENT IN AN ORGANIZED HEALTH CARE EDUCATION/TRAINING PROGRAM

## 2025-06-09 PROCEDURE — G0009 ADMIN PNEUMOCOCCAL VACCINE: HCPCS | Mod: S$GLB,,, | Performed by: STUDENT IN AN ORGANIZED HEALTH CARE EDUCATION/TRAINING PROGRAM

## 2025-06-09 PROCEDURE — 80053 COMPREHEN METABOLIC PANEL: CPT | Performed by: STUDENT IN AN ORGANIZED HEALTH CARE EDUCATION/TRAINING PROGRAM

## 2025-06-09 PROCEDURE — 3074F SYST BP LT 130 MM HG: CPT | Mod: CPTII,S$GLB,, | Performed by: STUDENT IN AN ORGANIZED HEALTH CARE EDUCATION/TRAINING PROGRAM

## 2025-06-09 PROCEDURE — 90677 PCV20 VACCINE IM: CPT | Mod: S$GLB,,, | Performed by: STUDENT IN AN ORGANIZED HEALTH CARE EDUCATION/TRAINING PROGRAM

## 2025-06-09 PROCEDURE — 3008F BODY MASS INDEX DOCD: CPT | Mod: CPTII,S$GLB,, | Performed by: STUDENT IN AN ORGANIZED HEALTH CARE EDUCATION/TRAINING PROGRAM

## 2025-06-09 PROCEDURE — 99205 OFFICE O/P NEW HI 60 MIN: CPT | Mod: 25,S$GLB,, | Performed by: STUDENT IN AN ORGANIZED HEALTH CARE EDUCATION/TRAINING PROGRAM

## 2025-06-09 PROCEDURE — 83036 HEMOGLOBIN GLYCOSYLATED A1C: CPT | Performed by: STUDENT IN AN ORGANIZED HEALTH CARE EDUCATION/TRAINING PROGRAM

## 2025-06-09 PROCEDURE — 99999 PR PBB SHADOW E&M-EST. PATIENT-LVL V: CPT | Mod: PBBFAC,,, | Performed by: STUDENT IN AN ORGANIZED HEALTH CARE EDUCATION/TRAINING PROGRAM

## 2025-06-09 PROCEDURE — 85025 COMPLETE CBC W/AUTO DIFF WBC: CPT | Performed by: STUDENT IN AN ORGANIZED HEALTH CARE EDUCATION/TRAINING PROGRAM

## 2025-06-09 PROCEDURE — 1125F AMNT PAIN NOTED PAIN PRSNT: CPT | Mod: CPTII,S$GLB,, | Performed by: STUDENT IN AN ORGANIZED HEALTH CARE EDUCATION/TRAINING PROGRAM

## 2025-06-09 RX ORDER — GLYCOPYRROLATE AND FORMOTEROL FUMARATE 9; 4.8 UG/1; UG/1
2 AEROSOL, METERED RESPIRATORY (INHALATION) 2 TIMES DAILY
Qty: 10.7 G | Refills: 3 | Status: SHIPPED | OUTPATIENT
Start: 2025-06-09 | End: 2025-09-07

## 2025-06-09 RX ORDER — ROSUVASTATIN CALCIUM 20 MG/1
20 TABLET, COATED ORAL DAILY
Qty: 90 TABLET | Refills: 3 | Status: SHIPPED | OUTPATIENT
Start: 2025-06-09 | End: 2026-06-09

## 2025-06-09 RX ORDER — POLYETHYLENE GLYCOL 3350 17 G/17G
17 POWDER, FOR SOLUTION ORAL DAILY
Start: 2025-06-09

## 2025-06-09 NOTE — PATIENT INSTRUCTIONS
Thank you so much for coming to see me today for a visit.  Please call with any questions or concerns. Have a good day.

## 2025-06-10 LAB
ALBUMIN/CREAT UR: NORMAL
CREAT UR-MCNC: 80 MG/DL (ref 15–325)
EAG (OHS): 114 MG/DL (ref 68–131)
HBA1C MFR BLD: 5.6 % (ref 4–5.6)
MICROALBUMIN UR-MCNC: <5 UG/ML (ref ?–5000)
TSH SERPL-ACNC: 1.31 UIU/ML (ref 0.4–4)

## 2025-06-19 ENCOUNTER — HOSPITAL ENCOUNTER (OUTPATIENT)
Dept: RADIOLOGY | Facility: HOSPITAL | Age: 75
Discharge: HOME | End: 2025-06-19
Attending: STUDENT IN AN ORGANIZED HEALTH CARE EDUCATION/TRAINING PROGRAM
Payer: MEDICARE

## 2025-06-19 DIAGNOSIS — Z87.891 PERSONAL HISTORY OF NICOTINE DEPENDENCE: ICD-10-CM

## 2025-06-19 PROCEDURE — 71271 CT THORAX LUNG CANCER SCR C-: CPT | Mod: TC

## 2025-06-23 ENCOUNTER — HOSPITAL ENCOUNTER (OUTPATIENT)
Dept: RADIOLOGY | Facility: HOSPITAL | Age: 75
Discharge: HOME | End: 2025-06-23
Attending: STUDENT IN AN ORGANIZED HEALTH CARE EDUCATION/TRAINING PROGRAM
Payer: MEDICARE

## 2025-06-23 PROCEDURE — 77067 SCR MAMMO BI INCL CAD: CPT | Mod: 26,,, | Performed by: RADIOLOGY

## 2025-06-23 PROCEDURE — 77063 BREAST TOMOSYNTHESIS BI: CPT | Mod: 26,,, | Performed by: RADIOLOGY

## 2025-06-23 PROCEDURE — 77063 BREAST TOMOSYNTHESIS BI: CPT | Mod: TC

## 2025-07-06 ENCOUNTER — RESULTS FOLLOW-UP (OUTPATIENT)
Facility: CLINIC | Age: 75
End: 2025-07-06

## 2025-07-17 ENCOUNTER — OFFICE VISIT (OUTPATIENT)
Facility: CLINIC | Age: 75
End: 2025-07-17
Payer: MEDICARE

## 2025-07-17 VITALS
DIASTOLIC BLOOD PRESSURE: 82 MMHG | HEART RATE: 84 BPM | SYSTOLIC BLOOD PRESSURE: 120 MMHG | OXYGEN SATURATION: 93 % | BODY MASS INDEX: 27.71 KG/M2 | TEMPERATURE: 97 F | HEIGHT: 67 IN | RESPIRATION RATE: 18 BRPM | WEIGHT: 176.56 LBS

## 2025-07-17 DIAGNOSIS — S16.1XXA STRAIN OF NECK MUSCLE, INITIAL ENCOUNTER: Primary | ICD-10-CM

## 2025-07-17 DIAGNOSIS — E78.5 HYPERLIPIDEMIA, UNSPECIFIED HYPERLIPIDEMIA TYPE: ICD-10-CM

## 2025-07-17 DIAGNOSIS — H53.8 BLURRED VISION, BILATERAL: ICD-10-CM

## 2025-07-17 DIAGNOSIS — J44.9 CHRONIC OBSTRUCTIVE PULMONARY DISEASE, UNSPECIFIED COPD TYPE: ICD-10-CM

## 2025-07-17 PROCEDURE — 99999 PR PBB SHADOW E&M-EST. PATIENT-LVL IV: CPT | Mod: PBBFAC,,, | Performed by: STUDENT IN AN ORGANIZED HEALTH CARE EDUCATION/TRAINING PROGRAM

## 2025-07-17 PROCEDURE — 99215 OFFICE O/P EST HI 40 MIN: CPT | Mod: S$GLB,,, | Performed by: STUDENT IN AN ORGANIZED HEALTH CARE EDUCATION/TRAINING PROGRAM

## 2025-07-17 PROCEDURE — 3288F FALL RISK ASSESSMENT DOCD: CPT | Mod: CPTII,S$GLB,, | Performed by: STUDENT IN AN ORGANIZED HEALTH CARE EDUCATION/TRAINING PROGRAM

## 2025-07-17 PROCEDURE — 3008F BODY MASS INDEX DOCD: CPT | Mod: CPTII,S$GLB,, | Performed by: STUDENT IN AN ORGANIZED HEALTH CARE EDUCATION/TRAINING PROGRAM

## 2025-07-17 PROCEDURE — 1101F PT FALLS ASSESS-DOCD LE1/YR: CPT | Mod: CPTII,S$GLB,, | Performed by: STUDENT IN AN ORGANIZED HEALTH CARE EDUCATION/TRAINING PROGRAM

## 2025-07-17 PROCEDURE — 3074F SYST BP LT 130 MM HG: CPT | Mod: CPTII,S$GLB,, | Performed by: STUDENT IN AN ORGANIZED HEALTH CARE EDUCATION/TRAINING PROGRAM

## 2025-07-17 PROCEDURE — 3044F HG A1C LEVEL LT 7.0%: CPT | Mod: CPTII,S$GLB,, | Performed by: STUDENT IN AN ORGANIZED HEALTH CARE EDUCATION/TRAINING PROGRAM

## 2025-07-17 PROCEDURE — 1125F AMNT PAIN NOTED PAIN PRSNT: CPT | Mod: CPTII,S$GLB,, | Performed by: STUDENT IN AN ORGANIZED HEALTH CARE EDUCATION/TRAINING PROGRAM

## 2025-07-17 PROCEDURE — 3079F DIAST BP 80-89 MM HG: CPT | Mod: CPTII,S$GLB,, | Performed by: STUDENT IN AN ORGANIZED HEALTH CARE EDUCATION/TRAINING PROGRAM

## 2025-07-17 PROCEDURE — 1159F MED LIST DOCD IN RCRD: CPT | Mod: CPTII,S$GLB,, | Performed by: STUDENT IN AN ORGANIZED HEALTH CARE EDUCATION/TRAINING PROGRAM

## 2025-07-17 PROCEDURE — 1160F RVW MEDS BY RX/DR IN RCRD: CPT | Mod: CPTII,S$GLB,, | Performed by: STUDENT IN AN ORGANIZED HEALTH CARE EDUCATION/TRAINING PROGRAM

## 2025-07-17 RX ORDER — MULTIVITAMIN
1 TABLET ORAL DAILY
COMMUNITY

## 2025-07-17 RX ORDER — TIZANIDINE HYDROCHLORIDE 2 MG/1
2 CAPSULE, GELATIN COATED ORAL DAILY
Qty: 30 CAPSULE | Refills: 0 | Status: SHIPPED | OUTPATIENT
Start: 2025-07-17 | End: 2025-08-16

## 2025-07-17 NOTE — PROGRESS NOTES
"Subjective:      Patient ID: Luiza Joseph is a 74 y.o. female  with a PMHx of  HLD, and tobacco dependence (46 years, quit 5/4/2017), COPD diagnosis, who presents to establish care.      Health Goals:   1) COPD   2) heart risk factors   3) right shoulder and neck pain, radiating   4) Be more active.     Chief Complaint: Hyperlipidemia (F/u)    CT Chest   Impression:  Lung-RADS Category: 2 - Benign Appearance or Behavior - continue annual screening with LDCT in 12 months.  Clinically or potentially clinically significant non lung cancer finding: None.  Prior Lung Cancer Modifier:  No history of prior lung cancer.    No smoking, still uses Nicoderm patch sometimes.     Picked up inhaler 7/10 but has not tried it yet    RECENT ILLNESS:  She reports upper respiratory symptoms including persistent phlegm production requiring frequent throat clearing. Symptoms extend beyond the throat region. She is currently taking prescribed medication twice daily.    MUSCULOSKELETAL:  She reports chronic neck pain with multiple areas of discomfort on both sides, describing sensation of bones "popping" during movement. Pain is mechanical and aggravated by certain neck positions. No radiation of pain, numbness, or tingling noted.    VISION:  She reports blurred vision and astigmatism. Her current and previous corrective lenses have been ineffective in addressing her visual symptoms.    DENTAL AND DIET:  She reports dietary challenges due to lack of teeth for 20 years, particularly with raw vegetables. She microwaves carrots briefly to soften them and eats cooked cabbage daily but reports fatigue with this diet. She expresses interest in obtaining a dental bridge to improve her ability to chew food.    SOCIAL HISTORY:  She reports significant stress due to neighborhood disturbances, including multiple incidents of unknown individuals coming to her doors searching for a previous resident, and issues with trash and disruptive behavior. " She lives with her sons, one of whom has autism and cannot be left alone. She quit smoking on May 4, 2017, after a 46-year history.    MEDICATIONS AND SUPPLEMENTS:  She takes vitamin B12, potassium, and magnesium supplements, initially started in 2008 for low levels. Currently takes magnesium glyconate 600mg daily, divided into two 300mg doses.      ROS:  General: -fever, -chills, -fatigue, -weight gain, -weight loss  Eyes: -vision changes, -redness, -discharge, +blurry vision  ENT: -ear pain, -nasal congestion, -sore throat  Cardiovascular: -chest pain, -palpitations, -lower extremity edema  Respiratory: -cough, -shortness of breath, +productive cough  Gastrointestinal: -abdominal pain, -nausea, -vomiting, -diarrhea, -constipation, -blood in stool  Genitourinary: -dysuria, -hematuria, -frequency  Musculoskeletal: -joint pain, -muscle pain, +neck pain, +difficulty walking, +exercise intolerance  Skin: -rash, -lesion  Neurological: -headache, -dizziness, -numbness, -tingling  Psychiatric: -anxiety, -depression, -sleep difficulty           The patient's Health Maintenance was reviewed and the following appears to be due at this time:  Health Maintenance Due   Topic Date Due    TETANUS VACCINE  Never done    Shingles Vaccine (1 of 2) Never done    RSV Vaccine (Age 60+ and Pregnant patients) (1 - Risk 60-74 years 1-dose series) Never done    COVID-19 Vaccine (5 - 2024-25 season) 09/01/2024       Past Medical History:  Past Medical History:   Diagnosis Date    Chronic obstructive pulmonary disease 6/9/2025    Hyperlipidemia      Past Surgical History:   Procedure Laterality Date    ANORECTAL MANOMETRY N/A 08/30/2023    Procedure: MANOMETRY, ANORECTAL;  Surgeon: Fahad Poole MD;  Location: [facility] ([address] [place] [address]);  Service: Endoscopy;  Laterality: N/A;  Enema prep  prep instructions mailed -  time frame 1-4 weeks   referral [facility]  08/23/2023 - pre-call completed, location, no ride needed, arrival time, enemas, NPO,  "pt may arrive early to do enemas if needed (fecal incontinence) LV3     Review of patient's allergies indicates:  No Known Allergies  Social History[1]  No family history on file.      Objective:   /82 (BP Location: Left arm, Patient Position: Sitting)   Pulse 84   Temp 96.5 °F (35.8 °C) (Temporal)   Resp 18   Ht 5' 7" (1.702 m)   Wt 80.1 kg (176 lb 8.7 oz)   SpO2 (!) 93%   BMI 27.65 kg/m²     Physical Exam  Constitutional:       Appearance: Normal appearance.   HENT:      Head: Normocephalic and atraumatic.      Comments: adentate  Eyes:      Extraocular Movements: Extraocular movements intact.      Conjunctiva/sclera: Conjunctivae normal.   Cardiovascular:      Rate and Rhythm: Normal rate and regular rhythm.   Pulmonary:      Effort: Pulmonary effort is normal.      Breath sounds: Normal breath sounds.   Abdominal:      General: Bowel sounds are normal.      Palpations: Abdomen is soft.      Tenderness: There is no abdominal tenderness.   Musculoskeletal:         General: Normal range of motion.   Skin:     General: Skin is warm and dry.   Neurological:      General: No focal deficit present.      Mental Status: She is alert and oriented to person, place, and time.   Psychiatric:         Mood and Affect: Mood normal.         Behavior: Behavior normal.        Assessment:     1. Strain of neck muscle, initial encounter    2. Chronic obstructive pulmonary disease, unspecified COPD type    3. Hyperlipidemia, unspecified hyperlipidemia type    4. Blurred vision, bilateral      Plan:     1. Strain of neck muscle, initial encounter  -     tiZANidine 2 mg Cap; Take 1 capsule (2 mg total) by mouth once daily.  Dispense: 30 capsule; Refill: 0    2. Chronic obstructive pulmonary disease, unspecified COPD type    3. Hyperlipidemia, unspecified hyperlipidemia type    4. Blurred vision, bilateral  -     Ambulatory referral/consult to Ophthalmology; Future; Expected date: 07/24/2025    Assessment & Plan    I10 " Essential (primary) hypertension  J43.2 Centrilobular emphysema  J41.1 Mucopurulent chronic bronchitis  M54.2 Cervicalgia  H52.201 Unspecified astigmatism, right eye  H52.202 Unspecified astigmatism, left eye  H53.8 Other visual disturbances  K08.102 Complete loss of teeth, unspecified cause, class II  D64.9 Anemia, unspecified  Z87.891 Personal history of nicotine dependence    ESSENTIAL HYPERTENSION:   Advised continued blood pressure monitoring.    EMPHYSEMA:   CT Chest shows emphysema in upper lung fields from smoking history with no nodules or cancer.   Emphasized importance of daily inhaler use to optimize lung function and prevent further damage.   Ordered follow-up CT Chest next year.    CHRONIC BRONCHITIS:   Luiza reports phlegm production and throat issues consistent with chronic bronchitis.   Prescribed medication to manage symptoms, to be taken 3 times daily.    CERVICALGIA:   Luiza reports neck pain and popping bones.   Physical exam confirms findings consistent with cervicalgia, likely due to muscle strain.   Instructed patient to perform daily neck stretches as demonstrated, particularly corner stretches to open up chest and back.   Prescribed 30 pills of low-dose muscle relaxant to be taken at night for pain relief.    ASTIGMATISM:   Discussed astigmatism and need for proper prescription glasses.   Referred to ophthalmologist for evaluation and prescription.    VISUAL DISTURBANCES:   Addressed patient's blurred vision.   Referred to ophthalmologist for proper evaluation and prescription, same appointment as for astigmatism.    COMPLETE LOSS OF TEETH:   Discussed complete loss of teeth and advised patient to consider dental prosthetics to aid in chewing and digestion.    ANEMIA:   Hemoglobin level is slightly below normal at 12, indicating mild anemia without signs of iron deficiency.   Advised patient to continue taking vitamins to manage this condition.    HISTORY OF NICOTINE DEPENDENCE:    Luiza quit smoking on May 4, 2017, after a 46-year smoking history.   Used Nicoderm patch to aid cessation.   CT shows lung damage from smoking.   Recommend daily inhaler use to optimize lung function and prevent further damage, as noted in emphysema plan.    FOLLOW-UP:   Scheduled follow up in 4 months.   Blood glucose levels have improved and are no longer in pre-diabetic range.           Medication List with Changes/Refills   New Medications    TIZANIDINE 2 MG CAP    Take 1 capsule (2 mg total) by mouth once daily.   Current Medications    GLYCOPYRROLATE-FORMOTEROL (BEVESPI AEROSPHERE) 9-4.8 MCG HFAA    Inhale 2 puffs into the lungs 2 (two) times a day. Controller    MULTIVITAMIN (ONE DAILY MULTIVITAMIN) PER TABLET    Take 1 tablet by mouth once daily.    NICOTINE (NICODERM CQ) 7 MG/24 HR    Place 1 patch onto the skin once daily.    POLYETHYLENE GLYCOL (GLYCOLAX) 17 GRAM/DOSE POWDER    Take 17 g by mouth once daily.    ROSUVASTATIN (CRESTOR) 20 MG TABLET    Take 1 tablet (20 mg total) by mouth once daily.       No follow-ups on file.         [1]   Social History  Socioeconomic History    Marital status: Single   Tobacco Use    Smoking status: Former     Current packs/day: 0.00     Average packs/day: 1.5 packs/day for 46.0 years (69.0 ttl pk-yrs)     Types: Cigarettes     Start date: 1971     Quit date: 2017     Years since quittin.2    Smokeless tobacco: Never   Substance and Sexual Activity    Alcohol use: No    Drug use: No    Sexual activity: Not Currently     Partners: Male   Social History Narrative    Lives with 3 sons.     Social Drivers of Health     Financial Resource Strain: High Risk (2025)    Received from [facility] SDOH Screening     In the past year, have you been unable to get any of the following when you really needed them? choose all that apply.: Medicine or health care     In the past year, have you been unable to get any of the following when you really  needed them? choose all that apply.: Clothing   Food Insecurity: High Risk (2/20/2025)    Received from [facility] SDOH Screening     In the past 2 months, did you or others you live with eat smaller meals or skip meals because you didn't have money for food?: Yes   Transportation Needs: High Risk (2/20/2025)    Received from [facility] SDOH Screening     Has lack of transportation kept you from medical appointments, meetings, work or from getting things needed for daily living? choose all that apply.: Yes, it has kept me from medical appointments or from getting my medications     Has lack of transportation kept you from medical appointments, meetings, work or from getting things needed for daily living? choose all that apply.: Yes, it has kept me from non-medical meetings, appointments, work or from getting things that i need   Physical Activity: Not on File (12/18/2020)    Received from [facility]    Physical Activity     Physical Activity: 0   Stress: Not on File (12/18/2020)    Received from [facility]    Stress     Stress: 0   Recent Concern: Stress - At Risk (12/18/2020)    Received from [facility]    Stress     Stress: 2   Housing Stability: High Risk (2/20/2025)    Received from [facility] SDOH Screening     What is your living situation today?: I have a place to live today, but i am worried about losing it in the future

## 2025-07-17 NOTE — PATIENT INSTRUCTIONS
Thank you so much for coming to see me today for a visit.  Please call with any questions or concerns. Have a good day.         Try the muscle relaxant tizanidine 2mg at night --- You can take 1-2 tabs.       You are doing fantastic.  Please let us know if you need anything before your next appointment       
[facility]

## 2025-08-11 DIAGNOSIS — S16.1XXA STRAIN OF NECK MUSCLE, INITIAL ENCOUNTER: ICD-10-CM

## 2025-08-11 RX ORDER — TIZANIDINE HYDROCHLORIDE 2 MG/1
2 CAPSULE, GELATIN COATED ORAL DAILY PRN
Qty: 30 CAPSULE | Refills: 0 | Status: SHIPPED | OUTPATIENT
Start: 2025-08-11 | End: 2025-09-10

## 2025-08-19 ENCOUNTER — PATIENT MESSAGE (OUTPATIENT)
Dept: PRIMARY CARE CLINIC | Facility: CLINIC | Age: 75
End: 2025-08-19
Payer: MEDICARE